# Patient Record
Sex: FEMALE | Race: WHITE | NOT HISPANIC OR LATINO | Employment: FULL TIME | URBAN - NONMETROPOLITAN AREA
[De-identification: names, ages, dates, MRNs, and addresses within clinical notes are randomized per-mention and may not be internally consistent; named-entity substitution may affect disease eponyms.]

---

## 2017-11-22 ENCOUNTER — OFFICE VISIT (OUTPATIENT)
Dept: URGENT CARE | Facility: PHYSICIAN GROUP | Age: 22
End: 2017-11-22
Payer: MEDICAID

## 2017-11-22 VITALS
HEART RATE: 112 BPM | BODY MASS INDEX: 36.29 KG/M2 | DIASTOLIC BLOOD PRESSURE: 82 MMHG | OXYGEN SATURATION: 95 % | WEIGHT: 180 LBS | TEMPERATURE: 98.6 F | SYSTOLIC BLOOD PRESSURE: 118 MMHG | HEIGHT: 59 IN | RESPIRATION RATE: 16 BRPM

## 2017-11-22 DIAGNOSIS — J40 BRONCHITIS: ICD-10-CM

## 2017-11-22 PROCEDURE — 99214 OFFICE O/P EST MOD 30 MIN: CPT | Performed by: PHYSICIAN ASSISTANT

## 2017-11-22 RX ORDER — ALBUTEROL SULFATE 90 UG/1
2 AEROSOL, METERED RESPIRATORY (INHALATION) EVERY 6 HOURS PRN
Qty: 8.5 G | Refills: 0 | Status: SHIPPED | OUTPATIENT
Start: 2017-11-22 | End: 2019-11-10

## 2017-11-22 RX ORDER — BENZONATATE 100 MG/1
100 CAPSULE ORAL 3 TIMES DAILY PRN
Qty: 21 CAP | Refills: 0 | Status: SHIPPED | OUTPATIENT
Start: 2017-11-22 | End: 2017-11-29

## 2017-11-22 ASSESSMENT — ENCOUNTER SYMPTOMS
SINUS PAIN: 0
COUGH: 1
WHEEZING: 1
ABDOMINAL PAIN: 0
SHORTNESS OF BREATH: 1
FOCAL WEAKNESS: 0
FEVER: 0
SWOLLEN GLANDS: 0
VOMITING: 0
SPUTUM PRODUCTION: 0
CHILLS: 0
TREMORS: 0
SORE THROAT: 1
MYALGIAS: 0
BACK PAIN: 0
NAUSEA: 0
SENSORY CHANGE: 0
DIARRHEA: 0
RHINORRHEA: 1
HEADACHES: 0

## 2017-11-22 NOTE — PROGRESS NOTES
"Subjective:      Nelida Laird is a 22 y.o. female who presents with URI            URI    This is a new problem. Episode onset: 2 days ago. The problem has been gradually worsening. There has been no fever. Associated symptoms include congestion, coughing, rhinorrhea, a sore throat and wheezing. Pertinent negatives include no abdominal pain, chest pain, diarrhea, ear pain, headaches, joint pain, nausea, plugged ear sensation, rash, sinus pain, swollen glands or vomiting. She has tried nothing for the symptoms.   Several ill contacts.  Cough is dry, constant causing sore throat.  Wheezing yesterday/last night with sensation of swelling of the throat-now resolved.    Review of Systems   Constitutional: Negative for chills, fever and malaise/fatigue.   HENT: Positive for congestion, rhinorrhea and sore throat. Negative for ear pain and sinus pain.    Respiratory: Positive for cough, shortness of breath and wheezing. Negative for sputum production.    Cardiovascular: Negative for chest pain.   Gastrointestinal: Negative for abdominal pain, diarrhea, nausea and vomiting.   Musculoskeletal: Negative for back pain, joint pain and myalgias.   Skin: Negative for itching and rash.   Neurological: Negative for tremors, sensory change, focal weakness and headaches.          Objective:     /82   Pulse (!) 112   Temp 37 °C (98.6 °F)   Resp 16   Ht 1.499 m (4' 11\")   Wt 81.6 kg (180 lb)   SpO2 95%   BMI 36.36 kg/m²      Physical Exam   Constitutional: She is oriented to person, place, and time. She appears well-developed and well-nourished.   HENT:   Head: Normocephalic and atraumatic.   Right Ear: External ear normal.   Left Ear: External ear normal.   Mouth/Throat: Oropharynx is clear and moist.   Eyes: EOM are normal. Pupils are equal, round, and reactive to light.   Neck: Normal range of motion. Neck supple.   Cardiovascular: Normal rate, regular rhythm and normal heart sounds.    Pulmonary/Chest: Effort " normal and breath sounds normal. No respiratory distress. She has no wheezes. She has no rales.   Musculoskeletal: Normal range of motion.   Neurological: She is alert and oriented to person, place, and time.   Skin: Skin is warm and dry.   Psychiatric: She has a normal mood and affect. Her behavior is normal. Judgment and thought content normal.   Nursing note and vitals reviewed.              Assessment/Plan:     1. Bronchitis  Patient is well appearing on exam, vitals are stable.  No evidence of respiratory distress or bacterial infection.  Will treat symptoms with Tessalon and Albuterol.  Follow-up if symptoms change, get worse or new symptoms develop.    - benzonatate (TESSALON) 100 MG Cap; Take 1 Cap by mouth 3 times a day as needed for Cough for up to 7 days.  Dispense: 21 Cap; Refill: 0  - albuterol 108 (90 Base) MCG/ACT Aero Soln inhalation aerosol; Inhale 2 Puffs by mouth every 6 hours as needed for Shortness of Breath.  Dispense: 8.5 g; Refill: 0

## 2018-06-03 ENCOUNTER — OFFICE VISIT (OUTPATIENT)
Dept: URGENT CARE | Facility: PHYSICIAN GROUP | Age: 23
End: 2018-06-03
Payer: COMMERCIAL

## 2018-06-03 VITALS
HEART RATE: 92 BPM | OXYGEN SATURATION: 98 % | SYSTOLIC BLOOD PRESSURE: 130 MMHG | HEIGHT: 59 IN | WEIGHT: 180 LBS | BODY MASS INDEX: 36.29 KG/M2 | DIASTOLIC BLOOD PRESSURE: 80 MMHG | TEMPERATURE: 97.6 F

## 2018-06-03 DIAGNOSIS — M94.0 COSTOCHONDRITIS: ICD-10-CM

## 2018-06-03 PROCEDURE — 99214 OFFICE O/P EST MOD 30 MIN: CPT | Performed by: FAMILY MEDICINE

## 2018-06-03 RX ORDER — MELOXICAM 15 MG/1
15 TABLET ORAL DAILY
Qty: 30 TAB | Refills: 0 | Status: SHIPPED | OUTPATIENT
Start: 2018-06-03 | End: 2019-11-10

## 2018-06-03 ASSESSMENT — PAIN SCALES - GENERAL: PAINLEVEL: 5=MODERATE PAIN

## 2018-06-14 ASSESSMENT — ENCOUNTER SYMPTOMS
LOWER EXTREMITY EDEMA: 0
NAUSEA: 0
SHORTNESS OF BREATH: 1
WEAKNESS: 0
BACK PAIN: 0
SYNCOPE: 0
VOMITING: 0
IRREGULAR HEARTBEAT: 0

## 2018-06-14 NOTE — PROGRESS NOTES
"Subjective:   Nelida Laird is a 23 y.o. female who presents for Chest Pain (SOB, x 3 days)        Chest Pain    This is a new problem. The current episode started in the past 7 days. The onset quality is gradual. The problem occurs constantly. The problem has been waxing and waning. The pain is present in the lateral region. The pain is moderate. Associated symptoms include shortness of breath. Pertinent negatives include no back pain, irregular heartbeat, lower extremity edema, nausea, syncope, vomiting or weakness.     Review of Systems   Respiratory: Positive for shortness of breath.    Cardiovascular: Positive for chest pain. Negative for syncope.   Gastrointestinal: Negative for nausea and vomiting.   Musculoskeletal: Negative for back pain.   Neurological: Negative for weakness.     No Known Allergies   Objective:   /80   Pulse 92   Temp 36.4 °C (97.6 °F)   Ht 1.499 m (4' 11\")   Wt 81.6 kg (180 lb)   SpO2 98%   BMI 36.36 kg/m²   Physical Exam   Constitutional: She is oriented to person, place, and time. She appears well-developed and well-nourished. No distress.   HENT:   Head: Normocephalic and atraumatic.   Eyes: Conjunctivae and EOM are normal. Pupils are equal, round, and reactive to light.   Cardiovascular: Normal rate and regular rhythm.    No murmur heard.  Pulmonary/Chest: Effort normal and breath sounds normal. No respiratory distress. She exhibits tenderness. She exhibits no mass, no laceration, no edema, no deformity and no swelling.   Abdominal: Soft. She exhibits no distension. There is no tenderness.   Neurological: She is alert and oriented to person, place, and time. She has normal reflexes. No sensory deficit.   Skin: Skin is warm and dry.   Psychiatric: She has a normal mood and affect.         Assessment/Plan:   Assessment    1. Costochondritis  - meloxicam (MOBIC) 15 MG tablet; Take 1 Tab by mouth every day.  Dispense: 30 Tab; Refill: 0    Differential diagnosis, natural " history, supportive care, and indications for immediate follow-up discussed.

## 2019-03-14 ENCOUNTER — OFFICE VISIT (OUTPATIENT)
Dept: URGENT CARE | Facility: PHYSICIAN GROUP | Age: 24
End: 2019-03-14
Payer: COMMERCIAL

## 2019-03-14 VITALS
BODY MASS INDEX: 36.56 KG/M2 | OXYGEN SATURATION: 96 % | WEIGHT: 181 LBS | TEMPERATURE: 97.8 F | HEART RATE: 73 BPM | SYSTOLIC BLOOD PRESSURE: 140 MMHG | DIASTOLIC BLOOD PRESSURE: 90 MMHG | RESPIRATION RATE: 20 BRPM

## 2019-03-14 DIAGNOSIS — H65.93 FLUID LEVEL BEHIND TYMPANIC MEMBRANE OF BOTH EARS: ICD-10-CM

## 2019-03-14 DIAGNOSIS — J00 ACUTE RHINITIS: ICD-10-CM

## 2019-03-14 DIAGNOSIS — J06.9 URI WITH COUGH AND CONGESTION: ICD-10-CM

## 2019-03-14 PROCEDURE — 99214 OFFICE O/P EST MOD 30 MIN: CPT | Performed by: PHYSICIAN ASSISTANT

## 2019-03-14 RX ORDER — FLUTICASONE PROPIONATE 50 MCG
1 SPRAY, SUSPENSION (ML) NASAL 2 TIMES DAILY
Qty: 1 BOTTLE | Refills: 0 | Status: SHIPPED | OUTPATIENT
Start: 2019-03-14 | End: 2019-11-10

## 2019-03-14 RX ORDER — BENZONATATE 200 MG/1
200 CAPSULE ORAL 3 TIMES DAILY PRN
Qty: 30 CAP | Refills: 0 | Status: SHIPPED | OUTPATIENT
Start: 2019-03-14 | End: 2019-11-10

## 2019-03-14 RX ORDER — CHLORPHENIRAMINE MALEATE 4 MG/1
4 TABLET ORAL EVERY 6 HOURS PRN
Qty: 30 TAB | Refills: 0 | Status: SHIPPED | OUTPATIENT
Start: 2019-03-14 | End: 2019-11-10

## 2019-03-14 RX ORDER — NORGESTIMATE AND ETHINYL ESTRADIOL 7DAYSX3 28
KIT ORAL
COMMUNITY
Start: 2019-02-21 | End: 2019-11-10

## 2019-03-14 NOTE — PROGRESS NOTES
Chief Complaint   Patient presents with   • Sinus Problem     sinus pain, sore throat, eye irritation, chest congestion x3 days        HISTORY OF PRESENT ILLNESS: Patient is a 24 y.o. female who presents today for the following:    Cough x 3 days  + nasal congestion, yellow nasal drainage/sputum production, bilateral ear pain (right worse than left)  Denies fever, SOB, body aches, chills  OTC meds tried: mucinex, sudafed    There are no active problems to display for this patient.      Allergies:Patient has no known allergies.    Current Outpatient Prescriptions Ordered in [facility]   Medication Sig Dispense Refill   • benzonatate (TESSALON) 200 MG capsule Take 1 Cap by mouth 3 times a day as needed. 30 Cap 0   • chlorpheniramine (CHLOR-TRIMETRON) 4 MG Tab Take 1 Tab by mouth every 6 hours as needed (nasal congestion/ear pressure). 30 Tab 0   • fluticasone (FLONASE) 50 MCG/ACT nasal spray Spray 1 Spray in nose 2 times a day. 1 Bottle 0   • TRI-SPRINTEC 0.18/0.215/0.25 MG-35 MCG Tab      • meloxicam (MOBIC) 15 MG tablet Take 1 Tab by mouth every day. (Patient not taking: Reported on 3/14/2019) 30 Tab 0   • albuterol 108 (90 Base) MCG/ACT Aero Soln inhalation aerosol Inhale 2 Puffs by mouth every 6 hours as needed for Shortness of Breath. (Patient not taking: Reported on 3/14/2019) 8.5 g 0   • cyclobenzaprine (FLEXERIL) 10 MG Tab Take 1 Tab by mouth 3 times a day as needed. (Patient not taking: Reported on 3/14/2019) 30 Tab 0     No current Epic-ordered facility-administered medications on file.        No past medical history on file.    Social History   Substance Use Topics   • Smoking status: Never Smoker   • Smokeless tobacco: Never Used   • Alcohol use No       No family status information on file.   No family history on file.    Review of Systems:   Constitutional ROS: No unexpected change in weight, No weakness, No fatigue  Eye ROS: No recent significant change in vision, No eye pain, redness, discharge  Ear ROS: No  drainage, No tinnitus or vertigo, No recent change in hearing  Mouth/Throat ROS: No teeth or gum problems, No bleeding gums, No tongue complaints  Neck ROS: No swollen glands, No significant pain in neck  Pulmonary ROS: No chronic cough, sputum, or hemoptysis, No dyspnea on exertion, No wheezing  Cardiovascular ROS: No diaphoresis, No edema, No palpitations  Gastrointestinal ROS: No change in bowel habits, No significant change in appetite, No nausea, vomiting, diarrhea, or constipation  Musculoskeletal/Extremities ROS: No peripheral edema, No pain, redness or swelling on the joints  Hematologic/Lymphatic ROS: No chills, No night sweats, No weight loss  Skin/Integumentary ROS: No edema, No evidence of rash, No itching      Exam:  Blood pressure 140/90, pulse 73, temperature 36.6 °C (97.8 °F), resp. rate 20, weight 82.1 kg (181 lb), SpO2 96 %.  General: Well developed, well nourished. No distress.  Eye: PERRL/EOMI; conjunctivae clear, lids normal.  ENMT: Lips without lesions, MMM. Oropharynx is clear. Bilateral TMs are within normal limits but with clear fluid posterior lid bilaterally.  Nasal mucosa is edematous bilaterally.  Pulmonary: Unlabored respiratory effort. Lungs clear to auscultation, no wheezes, no rhonchi.  Cardiovascular: Regular rate and rhythm without murmur.    Neurologic: Grossly nonfocal. No facial asymmetry noted.  Lymph: No cervical lymphadenopathy noted.  Skin: Warm, dry, good turgor. No rashes in visible areas.   Psych: Normal mood. Alert and oriented x3. Judgment and insight is normal.    Assessment/Plan:  Discussed likely viral etiology. Discussed appropriate over-the-counter symptomatic medication, and when to return to clinic.  Use all medication as directed.  Follow up for worsening or persistent symptoms.  1. URI with cough and congestion  benzonatate (TESSALON) 200 MG capsule   2. Fluid level behind tympanic membrane of both ears  chlorpheniramine (CHLOR-TRIMETRON) 4 MG Tab     fluticasone (FLONASE) 50 MCG/ACT nasal spray   3. Acute rhinitis  chlorpheniramine (CHLOR-TRIMETRON) 4 MG Tab    fluticasone (FLONASE) 50 MCG/ACT nasal spray

## 2019-03-22 ENCOUNTER — OFFICE VISIT (OUTPATIENT)
Dept: URGENT CARE | Facility: PHYSICIAN GROUP | Age: 24
End: 2019-03-22
Payer: COMMERCIAL

## 2019-03-22 ENCOUNTER — HOSPITAL ENCOUNTER (OUTPATIENT)
Facility: MEDICAL CENTER | Age: 24
End: 2019-03-22
Attending: PHYSICIAN ASSISTANT
Payer: COMMERCIAL

## 2019-03-22 VITALS
TEMPERATURE: 97.6 F | BODY MASS INDEX: 37.37 KG/M2 | SYSTOLIC BLOOD PRESSURE: 112 MMHG | HEART RATE: 82 BPM | WEIGHT: 185 LBS | OXYGEN SATURATION: 98 % | DIASTOLIC BLOOD PRESSURE: 68 MMHG

## 2019-03-22 DIAGNOSIS — J02.9 PHARYNGITIS, UNSPECIFIED ETIOLOGY: ICD-10-CM

## 2019-03-22 PROCEDURE — 99213 OFFICE O/P EST LOW 20 MIN: CPT | Performed by: PHYSICIAN ASSISTANT

## 2019-03-22 PROCEDURE — 87077 CULTURE AEROBIC IDENTIFY: CPT

## 2019-03-22 PROCEDURE — 87070 CULTURE OTHR SPECIMN AEROBIC: CPT

## 2019-03-22 ASSESSMENT — ENCOUNTER SYMPTOMS
NAUSEA: 0
HEADACHES: 1
EYE PAIN: 0
TROUBLE SWALLOWING: 1
SORE THROAT: 1
ABDOMINAL PAIN: 0
HOARSE VOICE: 1
FEVER: 0
SHORTNESS OF BREATH: 0
VOMITING: 0
COUGH: 1

## 2019-03-23 DIAGNOSIS — J02.9 SORE THROAT: ICD-10-CM

## 2019-03-23 NOTE — PROGRESS NOTES
Subjective:      Nelida Laird is a 24 y.o. female who presents with Otalgia (bilateral ear pain x 2 weeks ); Congestion (x 2 weeks ); and Wheezing            Pharyngitis    This is a recurrent problem. Episode onset: 11-12 days ago. The problem has been gradually worsening. Neither side of throat is experiencing more pain than the other. There has been no fever. The pain is moderate. Associated symptoms include congestion, coughing, ear pain, headaches, a hoarse voice, a plugged ear sensation and trouble swallowing. Pertinent negatives include no abdominal pain, ear discharge, shortness of breath or vomiting. She has had exposure to strep. Exposure to: possible. Treatments tried: Sudafed, Tessalon Perls, and a Decongestant. The treatment provided mild relief.     PMH:  has no past medical history on file.  MEDS:   Current Outpatient Prescriptions:   •  TRI-SPRINTEC 0.18/0.215/0.25 MG-35 MCG Tab, , Disp: , Rfl:   •  benzonatate (TESSALON) 200 MG capsule, Take 1 Cap by mouth 3 times a day as needed., Disp: 30 Cap, Rfl: 0  •  chlorpheniramine (CHLOR-TRIMETRON) 4 MG Tab, Take 1 Tab by mouth every 6 hours as needed (nasal congestion/ear pressure)., Disp: 30 Tab, Rfl: 0  •  fluticasone (FLONASE) 50 MCG/ACT nasal spray, Spray 1 Spray in nose 2 times a day. (Patient not taking: Reported on 3/22/2019), Disp: 1 Bottle, Rfl: 0  •  meloxicam (MOBIC) 15 MG tablet, Take 1 Tab by mouth every day. (Patient not taking: Reported on 3/14/2019), Disp: 30 Tab, Rfl: 0  •  albuterol 108 (90 Base) MCG/ACT Aero Soln inhalation aerosol, Inhale 2 Puffs by mouth every 6 hours as needed for Shortness of Breath. (Patient not taking: Reported on 3/14/2019), Disp: 8.5 g, Rfl: 0  •  cyclobenzaprine (FLEXERIL) 10 MG Tab, Take 1 Tab by mouth 3 times a day as needed. (Patient not taking: Reported on 3/14/2019), Disp: 30 Tab, Rfl: 0  ALLERGIES: No Known Allergies  SURGHX: No past surgical history on file.  SOCHX:  reports that she has never  smoked. She has never used smokeless tobacco. She reports that she does not drink alcohol or use drugs.  FH: Family history was reviewed, no pertinent findings to report    Review of Systems   Constitutional: Negative for fever.   HENT: Positive for congestion, ear pain, hoarse voice, sore throat and trouble swallowing. Negative for ear discharge.    Eyes: Negative for pain.   Respiratory: Positive for cough. Negative for shortness of breath.    Cardiovascular: Negative for chest pain and leg swelling.   Gastrointestinal: Negative for abdominal pain, nausea and vomiting.   Genitourinary: Negative for dysuria, frequency and urgency.   Musculoskeletal: Negative for joint pain.   Skin: Negative for rash.   Neurological: Positive for headaches.          Objective:     /68   Pulse 82   Temp 36.4 °C (97.6 °F) (Temporal)   Wt 83.9 kg (185 lb)   SpO2 98%   BMI 37.37 kg/m²      Physical Exam   Constitutional: She is oriented to person, place, and time. She appears well-developed and well-nourished. No distress.   HENT:   Head: Normocephalic and atraumatic.   Right Ear: Tympanic membrane, external ear and ear canal normal.   Left Ear: Tympanic membrane, external ear and ear canal normal.   Mouth/Throat: Mucous membranes are normal. Posterior oropharyngeal erythema present.   Eyes: Conjunctivae and EOM are normal.   Neck: Normal range of motion. Neck supple.   Cardiovascular: Normal rate, regular rhythm and normal heart sounds.    Pulmonary/Chest: Effort normal and breath sounds normal.   Musculoskeletal: Normal range of motion.   Neurological: She is alert and oriented to person, place, and time.   Skin: Skin is warm and dry.          Progress:   POCT Rapid Strep: Negative     Throat Culture - pending   Will call patient only if the results are positive, and she requires antibiotics.      Assessment/Plan:     1. Pharyngitis  The cause of the patient's sore throat is unknown at this time. It is likely due to a viral  illness. Her rapid strep test was negative today in clinic, indicating her symptoms are unlikely due to strep pharyngitis. Will culture her throat to rule out all other bacterial sources. Will only call the patient if her culture results are positive, and she requires antibiotics. Discussed strict return precautions with the patient, and she verbalized understanding.   Plan:  Throat Culture - pending  OTC Tylenol or Motrin for fever/discomfort.  OTC Supportive Care for Sore Throat - warm salt water gargles, sore throat lozenges, warm lemon water, and/or tea.  Drink plenty of fluids  Return to clinic or go to the ED if symptoms worsen or fail to improve, or if patient develops severe fever, worsening sore throat, change in voice, difficulty handling secretions, and/or shortness of breath.     Discussed plan with the patient, and she agrees to the above.

## 2019-03-24 ENCOUNTER — TELEPHONE (OUTPATIENT)
Dept: URGENT CARE | Facility: PHYSICIAN GROUP | Age: 24
End: 2019-03-24

## 2019-03-24 DIAGNOSIS — J02.8 PHARYNGITIS DUE TO OTHER ORGANISM: ICD-10-CM

## 2019-03-24 RX ORDER — AMOXICILLIN 500 MG/1
500 CAPSULE ORAL 2 TIMES DAILY
Qty: 20 CAP | Refills: 0 | Status: SHIPPED | OUTPATIENT
Start: 2019-03-24 | End: 2019-04-03

## 2019-03-25 LAB
BACTERIA SPEC RESP CULT: ABNORMAL
BACTERIA SPEC RESP CULT: ABNORMAL
SIGNIFICANT IND 70042: ABNORMAL
SITE SITE: ABNORMAL
SOURCE SOURCE: ABNORMAL

## 2019-03-25 NOTE — TELEPHONE ENCOUNTER
Called patient with the results of her throat culture.   Sent antibiotics to the patient's preferred pharmacy.   Instructed patient to return to clinic if her symptoms do not improve, despite the antibiotic use.

## 2019-11-10 ENCOUNTER — OFFICE VISIT (OUTPATIENT)
Dept: URGENT CARE | Facility: PHYSICIAN GROUP | Age: 24
End: 2019-11-10
Payer: COMMERCIAL

## 2019-11-10 VITALS
RESPIRATION RATE: 16 BRPM | WEIGHT: 184 LBS | HEART RATE: 72 BPM | HEIGHT: 59 IN | SYSTOLIC BLOOD PRESSURE: 118 MMHG | TEMPERATURE: 99.5 F | BODY MASS INDEX: 37.09 KG/M2 | OXYGEN SATURATION: 96 % | DIASTOLIC BLOOD PRESSURE: 62 MMHG

## 2019-11-10 DIAGNOSIS — R68.89 FLU-LIKE SYMPTOMS: ICD-10-CM

## 2019-11-10 PROCEDURE — 99214 OFFICE O/P EST MOD 30 MIN: CPT | Performed by: PHYSICIAN ASSISTANT

## 2019-11-10 RX ORDER — OSELTAMIVIR PHOSPHATE 75 MG/1
75 CAPSULE ORAL 2 TIMES DAILY
Qty: 10 CAP | Refills: 0 | Status: SHIPPED | OUTPATIENT
Start: 2019-11-10 | End: 2022-03-25

## 2019-11-10 NOTE — LETTER
November 10, 2019         Patient: Nelida Morrell   YOB: 1995   Date of Visit: 11/10/2019           To Whom it May Concern:    Nelida Morrell was seen in my clinic on 11/10/2019. She may return to work on 11/12/19.    If you have any questions or concerns, please don't hesitate to call.        Sincerely,           Isa Santiago P.A.-C.  Electronically Signed

## 2019-11-10 NOTE — PROGRESS NOTES
Chief Complaint   Patient presents with   • Cough     bodyache   • Fever       HISTORY OF PRESENT ILLNESS: Patient is a 24 y.o. female who presents today for the following:    Patient comes in for evaluation acute onset body aches that started 2 days ago.  Patient states she has had a cough for a few days but the body aches and fever started all of a sudden.  She states she hurts from her head to her toes.  Excedrin has not helped.  She denies any significant cough, runny nose, and has not had any nausea, vomiting, or diarrhea.  She has not had any over-the-counter medication today.    There are no active problems to display for this patient.      Allergies:Patient has no known allergies.    Current Outpatient Medications Ordered in Epic   Medication Sig Dispense Refill   • oseltamivir (TAMIFLU) 75 MG Cap Take 1 Cap by mouth 2 times a day. 10 Cap 0     No current Epic-ordered facility-administered medications on file.        History reviewed. No pertinent past medical history.    Social History     Tobacco Use   • Smoking status: Never Smoker   • Smokeless tobacco: Never Used   Substance Use Topics   • Alcohol use: No   • Drug use: No       No family status information on file.   History reviewed. No pertinent family history.    Review of Systems:   Constitutional ROS: No unexpected change in weight, No weakness, No fatigue  Eye ROS: No recent significant change in vision, No eye pain, redness, discharge  Ear ROS: No drainage, No tinnitus or vertigo, No recent change in hearing  Mouth/Throat ROS: No teeth or gum problems, No bleeding gums, No tongue complaints  Neck ROS: No swollen glands, No significant pain in neck  Pulmonary ROS: No chronic cough, sputum, or hemoptysis, No dyspnea on exertion, No wheezing  Cardiovascular ROS: No diaphoresis, No edema, No palpitations  Gastrointestinal ROS: No change in bowel habits, No significant change in appetite, No nausea, vomiting, diarrhea, or  "constipation  Musculoskeletal/Extremities ROS: No peripheral edema, No pain, redness or swelling on the joints  Hematologic/Lymphatic ROS: Positive for fever and body aches.  Skin/Integumentary ROS: No edema, No evidence of rash, No itching      Exam:  /62 (BP Location: Left arm, Patient Position: Sitting, BP Cuff Size: Adult)   Pulse 72   Temp 37.5 °C (99.5 °F) (Temporal)   Resp 16   Ht 1.499 m (4' 11\")   Wt 83.5 kg (184 lb)   SpO2 96%   General: Well developed, well nourished. No distress. Nontoxic in appearance but clearly does not feel well.  Eye: PERRL/EOMI; conjunctivae clear, lids normal.  ENMT: Lips without lesions, MMM. Oropharynx is clear. Bilateral TMs are within normal limits.  Pulmonary: Unlabored respiratory effort. Lungs clear to auscultation, no wheezes, no rhonchi.  Cardiovascular: Regular rate and rhythm without murmur.   Neurologic: Grossly nonfocal. No facial asymmetry noted.  Lymph: No cervical lymphadenopathy noted.  Skin: Warm, dry, good turgor. No rashes in visible areas.   Psych: Normal mood. Alert and oriented x3. Judgment and insight is normal.    Assessment/Plan  Discussed with patient that her symptoms are consistent with influenza.  Low suspicion for pneumonia given vital signs.  Discussed CDC recommendations for Tamiflu and patient would like to be treated.  Use all medication as directed.  Follow-up for worsening or persistent symptoms.   1. Flu-like symptoms  oseltamivir (TAMIFLU) 75 MG Cap       "

## 2020-10-29 ENCOUNTER — INITIAL PRENATAL (OUTPATIENT)
Dept: OBGYN | Facility: CLINIC | Age: 25
End: 2020-10-29
Payer: COMMERCIAL

## 2020-10-29 VITALS
WEIGHT: 187 LBS | DIASTOLIC BLOOD PRESSURE: 73 MMHG | HEIGHT: 59 IN | SYSTOLIC BLOOD PRESSURE: 117 MMHG | BODY MASS INDEX: 37.7 KG/M2

## 2020-10-29 DIAGNOSIS — O21.9 NAUSEA AND VOMITING IN PREGNANCY PRIOR TO 22 WEEKS GESTATION: ICD-10-CM

## 2020-10-29 DIAGNOSIS — N91.1 AMENORRHEA, SECONDARY: ICD-10-CM

## 2020-10-29 DIAGNOSIS — Z32.01 POSITIVE PREGNANCY TEST: ICD-10-CM

## 2020-10-29 PROCEDURE — 76830 TRANSVAGINAL US NON-OB: CPT | Performed by: OBSTETRICS & GYNECOLOGY

## 2020-10-29 PROCEDURE — 99203 OFFICE O/P NEW LOW 30 MIN: CPT | Mod: 25 | Performed by: OBSTETRICS & GYNECOLOGY

## 2020-10-29 RX ORDER — METOCLOPRAMIDE 10 MG/1
10 TABLET ORAL 4 TIMES DAILY
Qty: 15 TAB | Refills: 2 | Status: SHIPPED | OUTPATIENT
Start: 2020-10-29 | End: 2022-03-25

## 2020-10-29 NOTE — PROGRESS NOTES
"Subjective:      Nelida Morrell is a 25 y.o. female who presents with amenorrhea and positive home pregnancy test            HPI patient is a 25-year-old G1 who presents today with amenorrhea and positive home pregnancy test.  Patient is doing well except for complaints of some intermittent nausea with rare emesis.  She has had 1 episode of postcoital bleeding which is light.  She denies any fevers or dysuria.  Reports normal bowel and bladder functions.  Patient is taking prenatal vitamins.  Her last menstrual period is unsure/approximate around 7/27/2020.  Pregnancy was planned/patient was not using contraception    ROS all organ systems were reviewed and were negative except for complaints in HPI       Objective:     Ht 1.499 m (4' 11\")   Wt 84.8 kg (187 lb)   LMP 07/27/2020 (Approximate)   BMI 37.77 kg/m²      Physical Exam  Vitals signs and nursing note reviewed. Exam conducted with a chaperone present.   Constitutional:       General: She is not in acute distress.     Appearance: Normal appearance. She is obese. She is not toxic-appearing.   HENT:      Head: Normocephalic and atraumatic.      Nose: No congestion or rhinorrhea.   Eyes:      General:         Right eye: No discharge.         Left eye: No discharge.      Conjunctiva/sclera: Conjunctivae normal.   Neck:      Musculoskeletal: Normal range of motion and neck supple. No neck rigidity.   Cardiovascular:      Rate and Rhythm: Normal rate and regular rhythm.      Pulses: Normal pulses.      Heart sounds: Normal heart sounds. No murmur. No gallop.    Pulmonary:      Effort: Pulmonary effort is normal.      Breath sounds: Normal breath sounds. No rales.   Abdominal:      General: Abdomen is flat. Bowel sounds are normal. There is no distension.      Palpations: Abdomen is soft.      Tenderness: There is no abdominal tenderness.   Genitourinary:     General: Normal vulva.      Vagina: No vaginal discharge.   Musculoskeletal: Normal range of motion.     "  Right lower leg: No edema.      Left lower leg: No edema.   Lymphadenopathy:      Cervical: No cervical adenopathy.   Skin:     General: Skin is warm and dry.      Findings: No lesion or rash.   Neurological:      General: No focal deficit present.      Mental Status: She is alert and oriented to person, place, and time. Mental status is at baseline.      Gait: Gait normal.   Psychiatric:         Mood and Affect: Mood normal.         Behavior: Behavior normal.         Thought Content: Thought content normal.         Judgment: Judgment normal.            Ultrasound was performed and read by me:    Pool intrauterine pregnancy noted  Fetal heart rate was 161 bpm  Crown-rump length measurements give a gestational age of 12 weeks and 6 days  EDC by CRL is 5/7/2021  EDC by LMP is 5/3/2021  1.6 cm left hemorrhagic cyst noted  No right adnexal mass noted  No pelvic free fluid noted    Impression: Pool intrauterine pregnancy at 12 weeks and 6 days gestation with EDC of 5/3/2021.     Assessment/Plan:        1. Amenorrhea, secondary  25-year-old G1 presenting with amenorrhea and positive home pregnancy test.  Normal intrauterine pregnancy confirmed by ultrasound at 12 weeks and 6 days gestation.  Findings are reviewed.  Pregnancy precautions and restrictions were discussed  Patient to continue prenatal vitamins  Patient to follow-up in 1 week for new OB exam    2. Positive pregnancy test      3. Nausea and vomiting in pregnancy prior to 22 weeks gestation  We discussed nausea and vomiting in pregnancy and we discussed treatment options including dietary modification and vitamin B6 therapy.  Patient will try days and if she does not have relief she would like to try Reglan.  Patient counseled on medication and agrees to usage.  Prescription sent to pharmacy  - metoclopramide (REGLAN) 10 MG Tab; Take 1 Tab by mouth 4 times a day.  Dispense: 15 Tab; Refill: 2    4.  Precautions and plan of care reviewed

## 2020-10-29 NOTE — PROGRESS NOTES
Pt here today for   Pt states some n/v, had 1 episode of bleeding, did have intercourse  Reports +  Good # [phone]  Pharmacy Confirmed.

## 2020-11-05 ENCOUNTER — INITIAL PRENATAL (OUTPATIENT)
Dept: OBGYN | Facility: CLINIC | Age: 25
End: 2020-11-05
Payer: COMMERCIAL

## 2020-11-05 ENCOUNTER — HOSPITAL ENCOUNTER (OUTPATIENT)
Facility: MEDICAL CENTER | Age: 25
End: 2020-11-05
Attending: NURSE PRACTITIONER
Payer: COMMERCIAL

## 2020-11-05 VITALS — SYSTOLIC BLOOD PRESSURE: 135 MMHG | WEIGHT: 187 LBS | BODY MASS INDEX: 37.77 KG/M2 | DIASTOLIC BLOOD PRESSURE: 82 MMHG

## 2020-11-05 DIAGNOSIS — Z34.01 SUPERVISION OF NORMAL FIRST PREGNANCY IN FIRST TRIMESTER: Primary | ICD-10-CM

## 2020-11-05 PROCEDURE — 87624 HPV HI-RISK TYP POOLED RSLT: CPT

## 2020-11-05 PROCEDURE — 59401 PR NEW OB VISIT: CPT | Performed by: NURSE PRACTITIONER

## 2020-11-05 PROCEDURE — 88175 CYTOPATH C/V AUTO FLUID REDO: CPT

## 2020-11-05 PROCEDURE — 87591 N.GONORRHOEAE DNA AMP PROB: CPT

## 2020-11-05 PROCEDURE — 87491 CHLMYD TRACH DNA AMP PROBE: CPT

## 2020-11-05 ASSESSMENT — ENCOUNTER SYMPTOMS
GASTROINTESTINAL NEGATIVE: 1
CONSTITUTIONAL NEGATIVE: 1
PSYCHIATRIC NEGATIVE: 1
EYES NEGATIVE: 1
RESPIRATORY NEGATIVE: 1
CARDIOVASCULAR NEGATIVE: 1
MUSCULOSKELETAL NEGATIVE: 1

## 2020-11-05 NOTE — PROGRESS NOTES
Pt. Here for NOB visit today.  #  [phone]  First prenatal care  Pt. States no VB or LOF   Pharmacy verified  AFP?  yes  Denied Flu Shot

## 2020-11-06 DIAGNOSIS — Z34.01 SUPERVISION OF NORMAL FIRST PREGNANCY IN FIRST TRIMESTER: ICD-10-CM

## 2020-11-06 NOTE — PROGRESS NOTES
S:  Nelida Morrell is a 25 y.o.  who presents for her new OB exam.  She is 14w3d with and MAGNO of Estimated Date of Delivery:5/3/2021 . based off of LMPand confirmed with 12 week US . She has no complaints.  She is currently working at [employer], some  heavy lifting or chemical exposure. Denies ER visits or previous care in this pregnancy but did have  here with us.     Desires AFP, will decide if desires NIPT.  Desires CF.  Denies VB, LOF, or cramping.  She did have cramping until a  Week ago but it has resolved since.  Denies dysuria, vaginal DC. Reports no fetal movement.     Pt is  and lives with her .  Pregnancy is planned and welcomed.      Discussed diet and exercise during pregnancy. Encouraged good nutrition, and daily exercise including walking or swimming. Discussed expected weight gain during pregnancy. Discussed adequate hydration during pregnancy.  Review of Systems   Constitutional: Negative.    HENT: Negative.    Eyes: Negative.    Respiratory: Negative.    Cardiovascular: Negative.    Gastrointestinal: Negative.    Genitourinary: Negative.    Musculoskeletal: Negative.    Skin: Negative.    Endo/Heme/Allergies: Negative.    Psychiatric/Behavioral: Negative.    All other systems reviewed and are negative.      No past medical history on file.  No family history on file.  Social History     Socioeconomic History   • Marital status:      Spouse name: Not on file   • Number of children: Not on file   • Years of education: Not on file   • Highest education level: Not on file   Occupational History   • Not on file   Social Needs   • Financial resource strain: Not on file   • Food insecurity     Worry: Not on file     Inability: Not on file   • Transportation needs     Medical: Not on file     Non-medical: Not on file   Tobacco Use   • Smoking status: Never Smoker   • Smokeless tobacco: Never Used   Substance and Sexual Activity   • Alcohol use: No   • Drug use: No   • Sexual  activity: Yes     Partners: Male     Birth control/protection: None   Lifestyle   • Physical activity     Days per week: Not on file     Minutes per session: Not on file   • Stress: Not on file   Relationships   • Social connections     Talks on phone: Not on file     Gets together: Not on file     Attends Sabianism service: Not on file     Active member of club or organization: Not on file     Attends meetings of clubs or organizations: Not on file     Relationship status: Not on file   • Intimate partner violence     Fear of current or ex partner: Not on file     Emotionally abused: Not on file     Physically abused: Not on file     Forced sexual activity: Not on file   Other Topics Concern   • Not on file   Social History Narrative   • Not on file     OB History    Para Term  AB Living   1             SAB TAB Ectopic Molar Multiple Live Births                    # Outcome Date GA Lbr Abdon/2nd Weight Sex Delivery Anes PTL Lv   1 Current                History of Varicella Virus: had vaccine  History of HSV I or II in self or partner: denies  History of Thyroid problems: denies    O:  /82   Wt 84.8 kg (187 lb)    See Prenatal Physical.    Wet mount: denies  Physical Exam   Constitutional: She is oriented to person, place, and time and well-developed, well-nourished, and in no distress.   HENT:   Head: Normocephalic and atraumatic.   Right Ear: External ear normal.   Left Ear: External ear normal.   Eyes: Pupils are equal, round, and reactive to light. Conjunctivae and EOM are normal.   Neck: Normal range of motion. Neck supple. No thyromegaly present.   Cardiovascular: Normal rate, regular rhythm, normal heart sounds and intact distal pulses.   Pulmonary/Chest: Effort normal and breath sounds normal. No respiratory distress.   Abdominal: Soft. Bowel sounds are normal.   Genitourinary:    Vagina and cervix normal.      Genitourinary Comments: Some spotting with pap     Musculoskeletal: Normal  range of motion.   Neurological: She is alert and oriented to person, place, and time. Gait normal.   Skin: Skin is warm and dry.   Psychiatric: Mood, memory, affect and judgment normal.   Nursing note and vitals reviewed.        A:   1.  IUP @ 14w3d per LMP and confirmed by 12 week US        2.  S=D        3.  See problem list below        4.  Supervision of normal first pregnancy     There are no active problems to display for this patient.        P:  1.  GC/CT & pap done        2.  Prenatal labs ordered - lab slip given        3.  Discussed PNV, diet, avoidances and adequate water intake        4.  NOB packet given        5.  Return to office in 4 wks        6.  Complete OB US in 6 wks        7.  Will decide if she desires NIPT testing     Orders Placed This Encounter   • US-OB 2ND 3RD TRI COMPLETE   • PREG CNTR PRENATAL PN   • URINE DRUG SCREEN W/CONF (AR)   • THINPREP PAP W/HPV AND CTNG

## 2020-11-11 LAB
C TRACH DNA GENITAL QL NAA+PROBE: NEGATIVE
CYTOLOGY REG CYTOL: NORMAL
HPV HR 12 DNA CVX QL NAA+PROBE: NEGATIVE
HPV16 DNA SPEC QL NAA+PROBE: NEGATIVE
HPV18 DNA SPEC QL NAA+PROBE: NEGATIVE
N GONORRHOEA DNA GENITAL QL NAA+PROBE: NEGATIVE
SPECIMEN SOURCE: NORMAL
SPECIMEN SOURCE: NORMAL

## 2020-11-18 ENCOUNTER — HOSPITAL ENCOUNTER (OUTPATIENT)
Dept: LAB | Facility: MEDICAL CENTER | Age: 25
End: 2020-11-18
Attending: NURSE PRACTITIONER
Payer: COMMERCIAL

## 2020-11-18 DIAGNOSIS — Z34.01 SUPERVISION OF NORMAL FIRST PREGNANCY IN FIRST TRIMESTER: ICD-10-CM

## 2020-11-18 LAB — HIV 1+2 AB+HIV1 P24 AG SERPL QL IA: NORMAL

## 2020-11-18 PROCEDURE — 80307 DRUG TEST PRSMV CHEM ANLYZR: CPT

## 2020-11-18 PROCEDURE — 86850 RBC ANTIBODY SCREEN: CPT

## 2020-11-18 PROCEDURE — 86780 TREPONEMA PALLIDUM: CPT

## 2020-11-18 PROCEDURE — 81220 CFTR GENE COM VARIANTS: CPT

## 2020-11-18 PROCEDURE — 87389 HIV-1 AG W/HIV-1&-2 AB AG IA: CPT

## 2020-11-18 PROCEDURE — 87340 HEPATITIS B SURFACE AG IA: CPT

## 2020-11-18 PROCEDURE — 86900 BLOOD TYPING SEROLOGIC ABO: CPT

## 2020-11-18 PROCEDURE — 86762 RUBELLA ANTIBODY: CPT

## 2020-11-18 PROCEDURE — 85025 COMPLETE CBC W/AUTO DIFF WBC: CPT

## 2020-11-18 PROCEDURE — 81001 URINALYSIS AUTO W/SCOPE: CPT

## 2020-11-18 PROCEDURE — 36415 COLL VENOUS BLD VENIPUNCTURE: CPT

## 2020-11-18 PROCEDURE — 86901 BLOOD TYPING SEROLOGIC RH(D): CPT

## 2020-11-19 LAB
ABO GROUP BLD: NORMAL
APPEARANCE UR: ABNORMAL
BACTERIA #/AREA URNS HPF: ABNORMAL /HPF
BASOPHILS # BLD AUTO: 0.3 % (ref 0–1.8)
BASOPHILS # BLD: 0.03 K/UL (ref 0–0.12)
BILIRUB UR QL STRIP.AUTO: NEGATIVE
BLD GP AB SCN SERPL QL: NORMAL
CAOX CRY #/AREA URNS HPF: ABNORMAL /HPF
COLOR UR: YELLOW
EOSINOPHIL # BLD AUTO: 0.06 K/UL (ref 0–0.51)
EOSINOPHIL NFR BLD: 0.6 % (ref 0–6.9)
EPI CELLS #/AREA URNS HPF: ABNORMAL /HPF
ERYTHROCYTE [DISTWIDTH] IN BLOOD BY AUTOMATED COUNT: 39.2 FL (ref 35.9–50)
GLUCOSE UR STRIP.AUTO-MCNC: NEGATIVE MG/DL
HBV SURFACE AG SER QL: ABNORMAL
HCT VFR BLD AUTO: 41 % (ref 37–47)
HGB BLD-MCNC: 13.9 G/DL (ref 12–16)
HYALINE CASTS #/AREA URNS LPF: ABNORMAL /LPF
IMM GRANULOCYTES # BLD AUTO: 0.03 K/UL (ref 0–0.11)
IMM GRANULOCYTES NFR BLD AUTO: 0.3 % (ref 0–0.9)
KETONES UR STRIP.AUTO-MCNC: 40 MG/DL
LEUKOCYTE ESTERASE UR QL STRIP.AUTO: ABNORMAL
LYMPHOCYTES # BLD AUTO: 1.77 K/UL (ref 1–4.8)
LYMPHOCYTES NFR BLD: 16.6 % (ref 22–41)
MCH RBC QN AUTO: 29.8 PG (ref 27–33)
MCHC RBC AUTO-ENTMCNC: 33.9 G/DL (ref 33.6–35)
MCV RBC AUTO: 88 FL (ref 81.4–97.8)
MICRO URNS: ABNORMAL
MONOCYTES # BLD AUTO: 0.35 K/UL (ref 0–0.85)
MONOCYTES NFR BLD AUTO: 3.3 % (ref 0–13.4)
NEUTROPHILS # BLD AUTO: 8.42 K/UL (ref 2–7.15)
NEUTROPHILS NFR BLD: 78.9 % (ref 44–72)
NITRITE UR QL STRIP.AUTO: NEGATIVE
NRBC # BLD AUTO: 0 K/UL
NRBC BLD-RTO: 0 /100 WBC
PH UR STRIP.AUTO: 6 [PH] (ref 5–8)
PLATELET # BLD AUTO: 224 K/UL (ref 164–446)
PMV BLD AUTO: 9.7 FL (ref 9–12.9)
PROT UR QL STRIP: NEGATIVE MG/DL
RBC # BLD AUTO: 4.66 M/UL (ref 4.2–5.4)
RBC # URNS HPF: ABNORMAL /HPF
RBC UR QL AUTO: NEGATIVE
RH BLD: NORMAL
RUBV AB SER QL: 10.2 IU/ML
SP GR UR STRIP.AUTO: 1.02
TREPONEMA PALLIDUM IGG+IGM AB [PRESENCE] IN SERUM OR PLASMA BY IMMUNOASSAY: ABNORMAL
UROBILINOGEN UR STRIP.AUTO-MCNC: 1 MG/DL
WBC # BLD AUTO: 10.7 K/UL (ref 4.8–10.8)
WBC #/AREA URNS HPF: ABNORMAL /HPF

## 2020-11-19 PROCEDURE — 86900 BLOOD TYPING SEROLOGIC ABO: CPT

## 2020-11-19 PROCEDURE — 86901 BLOOD TYPING SEROLOGIC RH(D): CPT

## 2020-11-19 PROCEDURE — 86850 RBC ANTIBODY SCREEN: CPT

## 2020-11-20 LAB
AMPHET CTO UR CFM-MCNC: NEGATIVE NG/ML
BARBITURATES CTO UR CFM-MCNC: NEGATIVE NG/ML
BENZODIAZ CTO UR CFM-MCNC: NEGATIVE NG/ML
CANNABINOIDS CTO UR CFM-MCNC: POSITIVE NG/ML
COCAINE CTO UR CFM-MCNC: NEGATIVE NG/ML
DRUG COMMENT 753798: NORMAL
METHADONE CTO UR CFM-MCNC: NEGATIVE NG/ML
OPIATES CTO UR CFM-MCNC: NEGATIVE NG/ML
PCP CTO UR CFM-MCNC: NEGATIVE NG/ML
PROPOXYPH CTO UR CFM-MCNC: NEGATIVE NG/ML

## 2020-11-22 LAB — THC UR CFM-MCNC: 47 NG/ML

## 2020-11-23 PROBLEM — R82.5 POSITIVE URINE DRUG SCREEN: Status: ACTIVE | Noted: 2020-11-23

## 2020-11-28 LAB
CF EXPANDED VARIANT PANEL INTERP Q4864: NORMAL
CFTR ALLELE 1 BLD/T QL: NEGATIVE
CFTR ALLELE 1 BLD/T QL: NEGATIVE
CFTR MUT ANL BLD/T: NORMAL

## 2020-12-10 ENCOUNTER — ROUTINE PRENATAL (OUTPATIENT)
Dept: OBGYN | Facility: CLINIC | Age: 25
End: 2020-12-10
Payer: COMMERCIAL

## 2020-12-10 VITALS — DIASTOLIC BLOOD PRESSURE: 67 MMHG | BODY MASS INDEX: 38.17 KG/M2 | WEIGHT: 189 LBS | SYSTOLIC BLOOD PRESSURE: 128 MMHG

## 2020-12-10 DIAGNOSIS — Z34.01 SUPERVISION OF NORMAL FIRST PREGNANCY IN FIRST TRIMESTER: ICD-10-CM

## 2020-12-10 PROCEDURE — 90040 PR PRENATAL FOLLOW UP: CPT | Performed by: OBSTETRICS & GYNECOLOGY

## 2020-12-10 NOTE — PROGRESS NOTES
OB Followup;    19w3d    Patient Active Problem List    Diagnosis Date Noted   • Positive urine drug screen-dickibinoids 2020       Vitals:    12/10/20 0917   BP: 128/67   Weight: 85.7 kg (189 lb)       Patient presents for followup of OB care. Currently doing well . Good fetal movement no leakage of fluid no contractions or vaginal bleeding        Size equals dates, normal fetal heart rate      Labs; prenatal labs normal-patient did not perform AFP Tetra yet but she states she will do it as soon as possible  Patient has not scheduled OB fetal survey ultrasound-instructed to do so    Labor precautions given  Increase oral hydration  Discussed proper weight gain during pregnancy  Continue prenatal vitamins  Signs and symptoms of labor/ labor discussed   Discussed our group's policy on prenatal checkups and delivery    Followup in  4 weeks

## 2020-12-10 NOTE — PROGRESS NOTES
Pt here today for OB follow up  Pt states no VB or LOF   Reports +FM  Good # [phone]   Pharmacy Confirmed.

## 2020-12-11 ENCOUNTER — HOSPITAL ENCOUNTER (OUTPATIENT)
Dept: LAB | Facility: MEDICAL CENTER | Age: 25
End: 2020-12-11
Attending: NURSE PRACTITIONER
Payer: COMMERCIAL

## 2020-12-11 PROCEDURE — 36415 COLL VENOUS BLD VENIPUNCTURE: CPT

## 2020-12-11 PROCEDURE — 81511 FTL CGEN ABNOR FOUR ANAL: CPT

## 2020-12-15 LAB
# FETUSES US: NORMAL
AFP MOM SERPL: 0.96
AFP SERPL-MCNC: 44 NG/ML
AGE - REPORTED: 25.3 YR
CURRENT SMOKER: NO
FAMILY MEMBER DISEASES HX: NO
GA METHOD: NORMAL
GA: NORMAL WK
HCG MOM SERPL: 0.97
HCG SERPL-ACNC: NORMAL IU/L
HX OF HEREDITARY DISORDERS: NO
IDDM PATIENT QL: NO
INHIBIN A MOM SERPL: 0.83
INHIBIN A SERPL-MCNC: 126 PG/ML
INTEGRATED SCN PATIENT-IMP: NORMAL
PATHOLOGY STUDY: NORMAL
SPECIMEN DRAWN SERPL: NORMAL
U ESTRIOL MOM SERPL: 0.75
U ESTRIOL SERPL-MCNC: 1.72 NG/ML

## 2020-12-18 ENCOUNTER — HOSPITAL ENCOUNTER (OUTPATIENT)
Dept: RADIOLOGY | Facility: MEDICAL CENTER | Age: 25
End: 2020-12-18
Attending: NURSE PRACTITIONER
Payer: COMMERCIAL

## 2020-12-18 DIAGNOSIS — Z34.01 SUPERVISION OF NORMAL FIRST PREGNANCY IN FIRST TRIMESTER: ICD-10-CM

## 2020-12-18 PROCEDURE — 76805 OB US >/= 14 WKS SNGL FETUS: CPT

## 2020-12-22 DIAGNOSIS — Z34.80 SUPERVISION OF OTHER NORMAL PREGNANCY, ANTEPARTUM: ICD-10-CM

## 2021-01-15 ENCOUNTER — ROUTINE PRENATAL (OUTPATIENT)
Dept: OBGYN | Facility: CLINIC | Age: 26
End: 2021-01-15
Payer: COMMERCIAL

## 2021-01-15 VITALS — DIASTOLIC BLOOD PRESSURE: 64 MMHG | WEIGHT: 193 LBS | BODY MASS INDEX: 38.98 KG/M2 | SYSTOLIC BLOOD PRESSURE: 122 MMHG

## 2021-01-15 DIAGNOSIS — Z34.02 SUPERVISION OF NORMAL FIRST PREGNANCY IN SECOND TRIMESTER: ICD-10-CM

## 2021-01-15 PROCEDURE — 90040 PR PRENATAL FOLLOW UP: CPT | Performed by: OBSTETRICS & GYNECOLOGY

## 2021-01-15 PROCEDURE — 76818 FETAL BIOPHYS PROFILE W/NST: CPT | Performed by: OBSTETRICS & GYNECOLOGY

## 2021-01-15 NOTE — PROGRESS NOTES
OB follow up   + fetal movement.  No VB, LOF or UC's.  Phone # [phone]  Preferred pharmacy confirmed.  Pt report no problems at this time   3rd trimester lab orders pended and instructions given to patient

## 2021-01-15 NOTE — PROGRESS NOTES
S: Pt presents for routine OB follow up.  Patient is doing well without any concerns.  Denies headaches or scotoma.  Reports normal bowel and bladder functions  No contractions, vaginal bleeding, or leaking fluids. Good fetal movement.  Patient has follow-up ultrasound next week of fetal heart due to inadequate view previously  Questions answered.    O: LMP 2020 (Approximate)   Patients' weight gain, fluid intake and exercise level discussed.  Vitals, fundal height , fetal position, and FHR reviewed on flowsheet    Lab:No results found for this or any previous visit (from the past 336 hour(s)).    A/P:  26 y.o.  at 24w4d presents for routine obstetric follow-up.  Doing well  Size equals dates.  We discussed car seat, choosing pediatrician and breast-feeding.  She may need breast pump prescription at follow-up visit  Encounter Diagnosis   Name Primary?   • Supervision of normal first pregnancy in second trimester        1.  Continue prenatal vitamins.  2.  Begin kick counts at 28 weeks.  3.  Exercise at least 30 minutes daily.  4.  Drink at least 2 Liters of water daily  5.  Follow-up in 4 weeks.  6.  28-week lab slip given to patient with instructions  7.  Pregnancy precautions and plan of care reviewed

## 2021-01-21 ENCOUNTER — HOSPITAL ENCOUNTER (OUTPATIENT)
Dept: RADIOLOGY | Facility: MEDICAL CENTER | Age: 26
End: 2021-01-21
Attending: NURSE PRACTITIONER
Payer: COMMERCIAL

## 2021-01-21 DIAGNOSIS — Z34.80 SUPERVISION OF OTHER NORMAL PREGNANCY, ANTEPARTUM: ICD-10-CM

## 2021-01-21 PROCEDURE — 76815 OB US LIMITED FETUS(S): CPT

## 2021-02-10 ENCOUNTER — HOSPITAL ENCOUNTER (OUTPATIENT)
Dept: LAB | Facility: MEDICAL CENTER | Age: 26
End: 2021-02-10
Attending: OBSTETRICS & GYNECOLOGY
Payer: COMMERCIAL

## 2021-02-10 DIAGNOSIS — Z34.02 SUPERVISION OF NORMAL FIRST PREGNANCY IN SECOND TRIMESTER: ICD-10-CM

## 2021-02-10 LAB
BASOPHILS # BLD AUTO: 0.3 % (ref 0–1.8)
BASOPHILS # BLD: 0.03 K/UL (ref 0–0.12)
EOSINOPHIL # BLD AUTO: 0.05 K/UL (ref 0–0.51)
EOSINOPHIL NFR BLD: 0.4 % (ref 0–6.9)
ERYTHROCYTE [DISTWIDTH] IN BLOOD BY AUTOMATED COUNT: 44.5 FL (ref 35.9–50)
GLUCOSE 1H P 50 G GLC PO SERPL-MCNC: 131 MG/DL (ref 70–139)
HCT VFR BLD AUTO: 40.6 % (ref 37–47)
HGB BLD-MCNC: 13.5 G/DL (ref 12–16)
IMM GRANULOCYTES # BLD AUTO: 0.05 K/UL (ref 0–0.11)
IMM GRANULOCYTES NFR BLD AUTO: 0.4 % (ref 0–0.9)
LYMPHOCYTES # BLD AUTO: 1.94 K/UL (ref 1–4.8)
LYMPHOCYTES NFR BLD: 16.9 % (ref 22–41)
MCH RBC QN AUTO: 29.7 PG (ref 27–33)
MCHC RBC AUTO-ENTMCNC: 33.3 G/DL (ref 33.6–35)
MCV RBC AUTO: 89.2 FL (ref 81.4–97.8)
MONOCYTES # BLD AUTO: 0.47 K/UL (ref 0–0.85)
MONOCYTES NFR BLD AUTO: 4.1 % (ref 0–13.4)
NEUTROPHILS # BLD AUTO: 8.91 K/UL (ref 2–7.15)
NEUTROPHILS NFR BLD: 77.9 % (ref 44–72)
NRBC # BLD AUTO: 0 K/UL
NRBC BLD-RTO: 0 /100 WBC
PLATELET # BLD AUTO: 206 K/UL (ref 164–446)
PMV BLD AUTO: 10.3 FL (ref 9–12.9)
RBC # BLD AUTO: 4.55 M/UL (ref 4.2–5.4)
TREPONEMA PALLIDUM IGG+IGM AB [PRESENCE] IN SERUM OR PLASMA BY IMMUNOASSAY: NORMAL
WBC # BLD AUTO: 11.5 K/UL (ref 4.8–10.8)

## 2021-02-10 PROCEDURE — 82950 GLUCOSE TEST: CPT

## 2021-02-10 PROCEDURE — 36415 COLL VENOUS BLD VENIPUNCTURE: CPT

## 2021-02-10 PROCEDURE — 86780 TREPONEMA PALLIDUM: CPT

## 2021-02-10 PROCEDURE — 85025 COMPLETE CBC W/AUTO DIFF WBC: CPT

## 2021-02-11 ENCOUNTER — ROUTINE PRENATAL (OUTPATIENT)
Dept: OBGYN | Facility: CLINIC | Age: 26
End: 2021-02-11
Payer: COMMERCIAL

## 2021-02-11 VITALS — SYSTOLIC BLOOD PRESSURE: 126 MMHG | WEIGHT: 192 LBS | BODY MASS INDEX: 38.78 KG/M2 | DIASTOLIC BLOOD PRESSURE: 64 MMHG

## 2021-02-11 DIAGNOSIS — Z34.02 ENCOUNTER FOR SUPERVISION OF NORMAL FIRST PREGNANCY IN SECOND TRIMESTER: ICD-10-CM

## 2021-02-11 PROCEDURE — 90040 PR PRENATAL FOLLOW UP: CPT | Performed by: OBSTETRICS & GYNECOLOGY

## 2021-02-11 PROCEDURE — 90715 TDAP VACCINE 7 YRS/> IM: CPT | Performed by: OBSTETRICS & GYNECOLOGY

## 2021-02-11 PROCEDURE — 90471 IMMUNIZATION ADMIN: CPT | Performed by: OBSTETRICS & GYNECOLOGY

## 2021-02-11 NOTE — LETTER
"Count Your Baby's Movements  Another step to a healthy delivery    Nelida KEENAN             Dept: [phone]    How Many Weeks Pregnant? 28w3d    Date to Begin Countin2021              How to use this chart    One way for your physician to keep track of your baby's health is by knowing how often the baby moves (or \"kicks\") in your womb.  You can help your physician to do this by using this chart every day.    Every day, you should see how many hours it takes for your baby to move 10 times.  Start in the morning, as soon as you get up.    · First, write down the time your baby moves until you get to 10.  · Check off one box every time your baby moves until you get to 10.  · Write down the time you finished counting in the last column.  · Total how long it took to count up all 10 movements.  · Finally, fill in the box that shows how long this took.  After counting 10 movements, you no longer have to count any more that day.  The next morning, just start counting again as soon as you get up.    What should you call a \"movement\"?  It is hard to say, because it will feel different from one mother to another and from one pregnancy to the next.  The important thing is that you count the movements the same way throughout your pregnancy.  If you have more questions, you should ask your physician.    Count carefully every day!  SAMPLE:  Week 28    How many hours did it take to feel 10 movements?       Start  Time     1     2     3     4     5     6     7     8     9     10   Finish Time   Mon 8:20 ·  ·  ·  ·  ·  ·  ·  ·  ·  ·  11:40                  Sat               Sun                 IMPORTANT: You should contact your physician if it takes more than two hours for you to feel 10 movements.  Each morning, write down the time and start to count the movements of your baby.  Keep track by checking off one box every time you feel one movement.  When you have felt " "10 \"kicks\", write down the time you finished counting in the last column.  Then fill in the   box (over the check elizabeth) for the number of hours it took.  Be sure to read the complete instructions on the previous page.            "

## 2021-02-11 NOTE — PROGRESS NOTES
CRESENCIO:  28w3d    Pt reports doing well.  Denies vaginal bleeding, contractions, LOF.  Reports +FM.  Had leg cramp the other day; otherwise no concerns.    Wt 87.1 kg (192 lb)   LMP 2020 (Approximate)   BMI 38.78 kg/m²    BP: 126/64  gen: AAO, NAD  FHTs: 150  FH: 29    A/P: 26 y.o.  @ 28w3d      Estimated Date of Delivery: 5/3/21 by lmp c/w 12wk US  Rh+/-, RI; pnl wnl, + THC on UDS  QS neg    Anatomy US: wnl, limited cardiac view   [x] f/u US with visualized cardiac anatomy    1hr 131; 3rd tri labs wnl    [ ] flu  [ ] Tdap    [ ] GBS        Tdap today        RTC 2wks    Aziza Conner MD  [facility], Women's Health

## 2021-02-25 ENCOUNTER — ROUTINE PRENATAL (OUTPATIENT)
Dept: OBGYN | Facility: CLINIC | Age: 26
End: 2021-02-25
Payer: COMMERCIAL

## 2021-02-25 VITALS — BODY MASS INDEX: 38.58 KG/M2 | WEIGHT: 191 LBS | DIASTOLIC BLOOD PRESSURE: 69 MMHG | SYSTOLIC BLOOD PRESSURE: 135 MMHG

## 2021-02-25 DIAGNOSIS — Z34.03 SUPERVISION OF NORMAL FIRST PREGNANCY IN THIRD TRIMESTER: ICD-10-CM

## 2021-02-25 DIAGNOSIS — O99.213 OBESITY AFFECTING PREGNANCY IN THIRD TRIMESTER: ICD-10-CM

## 2021-02-25 PROCEDURE — 90040 PR PRENATAL FOLLOW UP: CPT | Performed by: OBSTETRICS & GYNECOLOGY

## 2021-02-25 RX ORDER — BREAST PUMP
EACH MISCELLANEOUS
Qty: 1 EACH | Refills: 0 | Status: SHIPPED | OUTPATIENT
Start: 2021-02-25 | End: 2022-03-25

## 2021-02-25 NOTE — PROGRESS NOTES
OB follow up   + fetal movement.  No VB, LOF or UC's.  Phone # [phone]  Preferred pharmacy confirmed  Pt reports no problems

## 2021-02-25 NOTE — PROGRESS NOTES
S: Pt presents for routine OB follow up.  Patient is doing well without any complaints or concerns.  Reports good fetal movement.  Denies headaches or scotoma.  Reports normal bowel and bladder functions  No contractions, vaginal bleeding, or leakage of fluid.  We discussed breast-feeding, car seat and choosing a pediatrician, prescription for breast pump given.  Pediatrician list provided  Questions answered.    O: LMP 2020 (Approximate)   Patients' weight gain, fluid intake and exercise level discussed.  Vitals, fundal height , fetal position, and FHR reviewed on flowsheet    Lab: 28-week labs are within normal limits    A/P:  26 y.o.  at 30w3d presents for routine obstetric follow-up.  Doing well  Size equals dates     1.  Continue prenatal vitamins.  2.  Fetal kick counts daily discussed.  3.  Exercise at least 30 minutes daily.  4.  Drink at least 2L of water daily  5.  Pregnancy and  labor precautions educated.  6.  Follow-up in 2 weeks.  7.  GBS culture at 36 weeks discussed

## 2021-03-11 ENCOUNTER — ROUTINE PRENATAL (OUTPATIENT)
Dept: OBGYN | Facility: CLINIC | Age: 26
End: 2021-03-11
Payer: COMMERCIAL

## 2021-03-11 VITALS — DIASTOLIC BLOOD PRESSURE: 74 MMHG | WEIGHT: 195 LBS | BODY MASS INDEX: 39.39 KG/M2 | SYSTOLIC BLOOD PRESSURE: 133 MMHG

## 2021-03-11 DIAGNOSIS — Z34.03 SUPERVISION OF NORMAL FIRST PREGNANCY IN THIRD TRIMESTER: ICD-10-CM

## 2021-03-11 PROCEDURE — 90040 PR PRENATAL FOLLOW UP: CPT | Performed by: OBSTETRICS & GYNECOLOGY

## 2021-03-11 NOTE — PROGRESS NOTES
Pt here today for OB follow up  Pt states no VB or LOF   Reports +FM  Good # [phone]   Pharmacy Confirmed.

## 2021-03-11 NOTE — PROGRESS NOTES
Chief complaint: Return visit    S: Pt presents for routine OB follow up. Good fetal movement.  No contractions, vaginal bleeding, or leakage of fluid.    Questions answered.    O: /74   Wt 88.5 kg (195 lb)   LMP 2020 (Approximate)   BMI 39.39 kg/m²   Patients' weight gain, fluid intake and exercise level discussed.  Vitals, fundal height , fetal position, and FHR reviewed on flowsheet    Lab:No results found for this or any previous visit (from the past 336 hour(s)).    A/P:  26 y.o.  at 32w3d presents for routine obstetric follow-up.  Size equals dates and/or scan    1.  Continue prenatal vitamins.  2.  Fetal kick counts.  3.  Exercise at least 30 minutes daily.  4.  Drink at least 2L of water daily  5.  Labor precautions educated.  6.  Follow-up in 2 weeks.  7.  GBS at 36 weeks    All questions answered    Monitor fundal height and pressure.

## 2021-03-25 ENCOUNTER — ROUTINE PRENATAL (OUTPATIENT)
Dept: OBGYN | Facility: CLINIC | Age: 26
End: 2021-03-25
Payer: COMMERCIAL

## 2021-03-25 VITALS — SYSTOLIC BLOOD PRESSURE: 129 MMHG | BODY MASS INDEX: 39.18 KG/M2 | WEIGHT: 194 LBS | DIASTOLIC BLOOD PRESSURE: 72 MMHG

## 2021-03-25 DIAGNOSIS — O99.213 OBESITY AFFECTING PREGNANCY IN THIRD TRIMESTER: ICD-10-CM

## 2021-03-25 PROCEDURE — 90040 PR PRENATAL FOLLOW UP: CPT | Performed by: OBSTETRICS & GYNECOLOGY

## 2021-03-25 NOTE — NON-PROVIDER
OB follow up   + fetal movement.  No VB, LOF or UC's.  Phone # [phone]  Preferred pharmacy confirmed.

## 2021-03-25 NOTE — PROGRESS NOTES
S: Pt presents for routine OB follow up.  No contractions, vaginal bleeding, or leaking fluids. Good fetal movement.  No complaints. Feeling more tired after her shifts but otherwise feeling well.     O: /72   Wt 88 kg (194 lb)   LMP 2020 (Approximate)   BMI 39.18 kg/m²   Vitals:    21 0913   BP: 129/72   Weight: 88 kg (194 lb)     Vitals, fundal height , fetal position, and FHR reviewed on flowsheet    Patient Active Problem List   Diagnosis   • Positive urine drug screen-cannibinoids   • Supervision of normal first pregnancy in third trimester   • Obesity affecting pregnancy in third trimester       Lab:  Recent Labs     20  1257 20  1300   ABOGROUP  --  AB   RUBELLAIGG 10.20  --    HEPBSAG Non-Reactive  --        A/P:  26 y.o.  at 34w3d presents for routine obstetric follow-up.     #Prenatal care  - Continue prenatal vitamins.  Estimated Date of Delivery: 5/3/21 by lmp c/w 12wk US  Rh+/-, RI; pnl wnl, + THC on UDS  QS neg    Anatomy US: wnl, limited cardiac view   [x] f/u US with visualized cardiac anatomy    Early marijuana use-stopped    1hr 131; 3rd tri labs wnl    [ ] flu  [x] Tdap 2021    [ ] GBS          - Follow-up: 2 weeks for GBS

## 2021-04-09 ENCOUNTER — ROUTINE PRENATAL (OUTPATIENT)
Dept: OBGYN | Facility: CLINIC | Age: 26
End: 2021-04-09
Payer: COMMERCIAL

## 2021-04-09 ENCOUNTER — HOSPITAL ENCOUNTER (OUTPATIENT)
Facility: MEDICAL CENTER | Age: 26
End: 2021-04-09
Attending: OBSTETRICS & GYNECOLOGY
Payer: COMMERCIAL

## 2021-04-09 VITALS — BODY MASS INDEX: 39.59 KG/M2 | DIASTOLIC BLOOD PRESSURE: 83 MMHG | SYSTOLIC BLOOD PRESSURE: 139 MMHG | WEIGHT: 196 LBS

## 2021-04-09 DIAGNOSIS — O99.213 OBESITY AFFECTING PREGNANCY IN THIRD TRIMESTER: ICD-10-CM

## 2021-04-09 PROCEDURE — 87081 CULTURE SCREEN ONLY: CPT

## 2021-04-09 PROCEDURE — 87150 DNA/RNA AMPLIFIED PROBE: CPT

## 2021-04-09 PROCEDURE — 90040 PR PRENATAL FOLLOW UP: CPT | Performed by: OBSTETRICS & GYNECOLOGY

## 2021-04-09 NOTE — NON-PROVIDER
OB follow up   + fetal movement.  No VB, LOF   Phone # [phone]  Preferred pharmacy confirmed.  GBS today  C/o cramping

## 2021-04-09 NOTE — PROGRESS NOTES
S: Pt presents for routine OB follow up.  No contractions, vaginal bleeding, or leaking fluids. Good fetal movement.  She states that she gets a lot of walking and at work and has cramping at the end of the day and is very tired.  This is not bothering her right now but she is concerned that this may have an adverse effect on her pregnancy.  She is not lifting anything heavy, just walking.  O: /83   Wt 88.9 kg (196 lb)   LMP 2020 (Approximate)   BMI 39.59 kg/m²   Vitals:    21 0834   BP: 139/83   Weight: 88.9 kg (196 lb)     Vitals, fundal height , fetal position, and FHR reviewed on flowsheet    Patient Active Problem List   Diagnosis   • Positive urine drug screen-cannibinoids   • Supervision of normal first pregnancy in third trimester   • Obesity affecting pregnancy in third trimester       Lab:  Recent Labs     20  1257 20  1300   ABOGROUP  --  AB   RUBELLAIGG 10.20  --    HEPBSAG Non-Reactive  --        A/P:  26 y.o.  at 36w4d presents for routine obstetric follow-up.     #Prenatal care  - Continue prenatal vitamins.  Estimated Date of Delivery: 5/3/21 by lmp c/w 12wk US  Rh+/-, RI; pnl wnl, + THC on UDS  QS neg    Anatomy US: wnl, limited cardiac view   [x] f/u US with visualized cardiac anatomy    Early marijuana use-stopped    1hr 131; 3rd tri labs wnl    [ ] flu  [x] Tdap 2021    [ ] GBS        -We discussed that walking and at good cardiovascular health is beneficial in pregnancy.  I advised that there is no contraindication to exercise or walking during pregnancy.  If patient desires a work note, we may facilitate this for her and place her on restricted activity at work as needed.  Patient will see how she does this next week and then let the office know.  - GBS today  - Follow-up: 1 week

## 2021-04-11 LAB — GP B STREP DNA SPEC QL NAA+PROBE: NEGATIVE

## 2021-04-16 ENCOUNTER — ROUTINE PRENATAL (OUTPATIENT)
Dept: OBGYN | Facility: CLINIC | Age: 26
End: 2021-04-16
Payer: COMMERCIAL

## 2021-04-16 VITALS — DIASTOLIC BLOOD PRESSURE: 74 MMHG | WEIGHT: 197 LBS | SYSTOLIC BLOOD PRESSURE: 127 MMHG | BODY MASS INDEX: 39.79 KG/M2

## 2021-04-16 DIAGNOSIS — Z34.03 ENCOUNTER FOR SUPERVISION OF NORMAL FIRST PREGNANCY IN THIRD TRIMESTER: ICD-10-CM

## 2021-04-16 PROCEDURE — 90040 PR PRENATAL FOLLOW UP: CPT | Performed by: OBSTETRICS & GYNECOLOGY

## 2021-04-16 NOTE — PROGRESS NOTES
CRESENCIO:  37w4d    Pt reports doing well.  Denies vaginal bleeding, contractions, LOF.  Reports +FM.      /74   Wt 89.4 kg (197 lb)   LMP 2020 (Approximate)   BMI 39.79 kg/m²   gen: AAO, NAD  FHTs: 140  FH: 38    A/P: 26 y.o.  @ 37w4d      Estimated Date of Delivery: 5/3/21 by lmp c/w 12wk US  Rh+/-, RI; pnl wnl, + THC on UDS  QS neg    Anatomy US: wnl, limited cardiac view   [x] f/u US with visualized cardiac anatomy    Early marijuana use-stopped    1hr 131; 3rd tri labs wnl    [ ] flu  [x] Tdap 2021    [x] GBS neg      considering pills  Desires to BF      Reviewed labor precautions (to call or come to hospital for ctx q5min, VB, LOF, decreased FM)    RTC 1wks    Aziza Conner MD  [facility], Women's Health

## 2021-04-16 NOTE — PROGRESS NOTES
Pt here today for OB follow up @ 37w4d  Pt states denies VB and LOF  Reports +FM  Good # [phone]   Pharmacy Confirmed.

## 2021-04-23 ENCOUNTER — ROUTINE PRENATAL (OUTPATIENT)
Dept: OBGYN | Facility: CLINIC | Age: 26
End: 2021-04-23
Payer: COMMERCIAL

## 2021-04-23 VITALS — BODY MASS INDEX: 39.39 KG/M2 | DIASTOLIC BLOOD PRESSURE: 71 MMHG | WEIGHT: 195 LBS | SYSTOLIC BLOOD PRESSURE: 122 MMHG

## 2021-04-23 DIAGNOSIS — Z34.03 ENCOUNTER FOR SUPERVISION OF NORMAL FIRST PREGNANCY IN THIRD TRIMESTER: ICD-10-CM

## 2021-04-23 PROCEDURE — 90040 PR PRENATAL FOLLOW UP: CPT | Performed by: OBSTETRICS & GYNECOLOGY

## 2021-04-23 NOTE — PROGRESS NOTES
Pt here today for OB follow up  Pt states no VB or LOF   Reports +FM  Good # [phone]   Pharmacy Confirmed.   Chaperone offered and provided.   GBS negative pt aware

## 2021-04-23 NOTE — PROGRESS NOTES
OB Followup;    38w4d    Patient Active Problem List    Diagnosis Date Noted   • Obesity affecting pregnancy in third trimester 2021   • Supervision of normal first pregnancy in third trimester 01/15/2021   • Positive urine drug screen-cannibinoids 2020       Vitals:    21 0955 21 0957   BP: 132/76 122/71   Weight: 88.5 kg (195 lb)        Patient presents for followup of OB care. Currently doing well . Good fetal movement no leakage of fluid no contractions or vaginal bleeding        Size equals dates, normal fetal heart rate    Patient would like to put off induction until she is couple days past her due date at the earliest  Referral placed for induction of labor at 40 2/7 weeks    Labor precautions given  Discussed proper weight gain during pregnancy.    Signs and symptoms of labor/ labor discussed   Discussed our group's policy on prenatal checkups and delivery  Discussed Covid precautions  Discussed Covid vaccination recommendations from CDC/ACOG  Discussed proper exercise during pregnancy  Discussed proper oral fluid hydration  Currently taking prenatal vitamins as instructed  Reviewed fetal kick counts and appropriate fetal movement during pregnancy  Reviewed postpartum birth control methods  All questions answered in detail    Followup in  1 weeks

## 2021-04-30 ENCOUNTER — ROUTINE PRENATAL (OUTPATIENT)
Dept: OBGYN | Facility: CLINIC | Age: 26
End: 2021-04-30
Payer: COMMERCIAL

## 2021-04-30 VITALS — SYSTOLIC BLOOD PRESSURE: 115 MMHG | DIASTOLIC BLOOD PRESSURE: 85 MMHG | WEIGHT: 193 LBS | BODY MASS INDEX: 38.98 KG/M2

## 2021-04-30 DIAGNOSIS — Z34.03 SUPERVISION OF NORMAL FIRST PREGNANCY IN THIRD TRIMESTER: ICD-10-CM

## 2021-04-30 DIAGNOSIS — O99.213 OBESITY AFFECTING PREGNANCY IN THIRD TRIMESTER: ICD-10-CM

## 2021-04-30 PROCEDURE — 90040 PR PRENATAL FOLLOW UP: CPT | Performed by: OBSTETRICS & GYNECOLOGY

## 2021-04-30 NOTE — PROGRESS NOTES
S: Pt presents for routine OB follow up.  Patient is doing well without any complaints or concerns.  Reports good fetal movement.  Denies headaches or scotoma.  Has normal bowel and bladder functions  No contractions, vaginal bleeding, or leakage of fluid.  Patient has induction of labor scheduled next week Friday.  Does not want cervical check today  Questions answered.    O: /85   Wt 87.5 kg (193 lb)   LMP 2020 (Approximate)   BMI 38.98 kg/m²   Patients' weight gain, fluid intake and exercise level discussed.  Vitals, fundal height , fetal position, and FHR reviewed on flowsheet    Lab:No results found for this or any previous visit (from the past 336 hour(s)).    A/P:  26 y.o.  at 39w4d presents for routine obstetric follow-up.  Doing well currently  Size equals dates .  I reviewed induction of labor process and pain management in labor  Encounter Diagnoses   Name Primary?   • Supervision of normal first pregnancy in third trimester    • Obesity affecting pregnancy in third trimester        1.  Continue prenatal vitamins.  2.  Fetal kick counts daily reviewed.  3.  Exercise at least 30 minutes daily.  4.  Drink at least 2L of water daily  5.  Pregnancy and labor precautions educated.  6.  Follow-up in 1 week  7.  GBS culture is negative

## 2021-04-30 NOTE — NON-PROVIDER
OB follow up   + fetal movement.  No VB, LOF or UC's.  Phone # [phone]  Preferred pharmacy confirmed.  IOL 05/17/2021  GBS negative

## 2021-05-04 ENCOUNTER — HOSPITAL ENCOUNTER (OUTPATIENT)
Dept: OBGYN | Facility: MEDICAL CENTER | Age: 26
End: 2021-05-04
Attending: OBSTETRICS & GYNECOLOGY
Payer: COMMERCIAL

## 2021-05-04 LAB
SARS-COV-2 RNA RESP QL NAA+PROBE: NOTDETECTED
SPECIMEN SOURCE: NORMAL

## 2021-05-04 PROCEDURE — U0005 INFEC AGEN DETEC AMPLI PROBE: HCPCS

## 2021-05-04 PROCEDURE — U0003 INFECTIOUS AGENT DETECTION BY NUCLEIC ACID (DNA OR RNA); SEVERE ACUTE RESPIRATORY SYNDROME CORONAVIRUS 2 (SARS-COV-2) (CORONAVIRUS DISEASE [COVID-19]), AMPLIFIED PROBE TECHNIQUE, MAKING USE OF HIGH THROUGHPUT TECHNOLOGIES AS DESCRIBED BY CMS-2020-01-R: HCPCS

## 2021-05-04 NOTE — PROGRESS NOTES
Pt presents to L&D for preadmission COVID swab. POC discussed. PT denies any s/s or exposure to COVID. Pt discharged home with self isolation instructions.

## 2021-05-07 ENCOUNTER — HOSPITAL ENCOUNTER (INPATIENT)
Facility: MEDICAL CENTER | Age: 26
LOS: 1 days | End: 2021-05-09
Attending: OBSTETRICS & GYNECOLOGY | Admitting: OBSTETRICS & GYNECOLOGY
Payer: COMMERCIAL

## 2021-05-07 ENCOUNTER — APPOINTMENT (OUTPATIENT)
Dept: OBGYN | Facility: MEDICAL CENTER | Age: 26
End: 2021-05-07
Attending: OBSTETRICS & GYNECOLOGY
Payer: COMMERCIAL

## 2021-05-07 LAB
BASOPHILS # BLD AUTO: 0.2 % (ref 0–1.8)
BASOPHILS # BLD: 0.02 K/UL (ref 0–0.12)
EOSINOPHIL # BLD AUTO: 0.07 K/UL (ref 0–0.51)
EOSINOPHIL NFR BLD: 0.5 % (ref 0–6.9)
ERYTHROCYTE [DISTWIDTH] IN BLOOD BY AUTOMATED COUNT: 41.9 FL (ref 35.9–50)
HCT VFR BLD AUTO: 40.6 % (ref 37–47)
HGB BLD-MCNC: 13.7 G/DL (ref 12–16)
HOLDING TUBE BB 8507: NORMAL
IMM GRANULOCYTES # BLD AUTO: 0.07 K/UL (ref 0–0.11)
IMM GRANULOCYTES NFR BLD AUTO: 0.5 % (ref 0–0.9)
LYMPHOCYTES # BLD AUTO: 2.53 K/UL (ref 1–4.8)
LYMPHOCYTES NFR BLD: 19.5 % (ref 22–41)
MCH RBC QN AUTO: 29.6 PG (ref 27–33)
MCHC RBC AUTO-ENTMCNC: 33.7 G/DL (ref 33.6–35)
MCV RBC AUTO: 87.7 FL (ref 81.4–97.8)
MONOCYTES # BLD AUTO: 0.67 K/UL (ref 0–0.85)
MONOCYTES NFR BLD AUTO: 5.2 % (ref 0–13.4)
NEUTROPHILS # BLD AUTO: 9.59 K/UL (ref 2–7.15)
NEUTROPHILS NFR BLD: 74.1 % (ref 44–72)
NRBC # BLD AUTO: 0 K/UL
NRBC BLD-RTO: 0 /100 WBC
PLATELET # BLD AUTO: 185 K/UL (ref 164–446)
PMV BLD AUTO: 11.1 FL (ref 9–12.9)
RBC # BLD AUTO: 4.63 M/UL (ref 4.2–5.4)
WBC # BLD AUTO: 13 K/UL (ref 4.8–10.8)

## 2021-05-07 PROCEDURE — 700105 HCHG RX REV CODE 258: Performed by: NURSE PRACTITIONER

## 2021-05-07 PROCEDURE — 85025 COMPLETE CBC W/AUTO DIFF WBC: CPT

## 2021-05-07 PROCEDURE — 700111 HCHG RX REV CODE 636 W/ 250 OVERRIDE (IP)

## 2021-05-07 PROCEDURE — 700102 HCHG RX REV CODE 250 W/ 637 OVERRIDE(OP): Performed by: OBSTETRICS & GYNECOLOGY

## 2021-05-07 PROCEDURE — 36415 COLL VENOUS BLD VENIPUNCTURE: CPT

## 2021-05-07 PROCEDURE — A9270 NON-COVERED ITEM OR SERVICE: HCPCS | Performed by: OBSTETRICS & GYNECOLOGY

## 2021-05-07 RX ORDER — SODIUM CHLORIDE, SODIUM LACTATE, POTASSIUM CHLORIDE, CALCIUM CHLORIDE 600; 310; 30; 20 MG/100ML; MG/100ML; MG/100ML; MG/100ML
INJECTION, SOLUTION INTRAVENOUS CONTINUOUS
Status: ACTIVE | OUTPATIENT
Start: 2021-05-07 | End: 2021-05-07

## 2021-05-07 RX ORDER — ALUMINA, MAGNESIA, AND SIMETHICONE 2400; 2400; 240 MG/30ML; MG/30ML; MG/30ML
30 SUSPENSION ORAL EVERY 6 HOURS PRN
Status: DISCONTINUED | OUTPATIENT
Start: 2021-05-07 | End: 2021-05-08 | Stop reason: HOSPADM

## 2021-05-07 RX ORDER — ROPIVACAINE HYDROCHLORIDE 2 MG/ML
INJECTION, SOLUTION EPIDURAL; INFILTRATION; PERINEURAL
Status: COMPLETED
Start: 2021-05-07 | End: 2021-05-08

## 2021-05-07 RX ORDER — SODIUM CHLORIDE, SODIUM LACTATE, POTASSIUM CHLORIDE, CALCIUM CHLORIDE 600; 310; 30; 20 MG/100ML; MG/100ML; MG/100ML; MG/100ML
INJECTION, SOLUTION INTRAVENOUS CONTINUOUS
Status: DISCONTINUED | OUTPATIENT
Start: 2021-05-07 | End: 2021-05-09

## 2021-05-07 RX ORDER — CITRIC ACID/SODIUM CITRATE 334-500MG
30 SOLUTION, ORAL ORAL EVERY 6 HOURS PRN
Status: DISCONTINUED | OUTPATIENT
Start: 2021-05-07 | End: 2021-05-08 | Stop reason: HOSPADM

## 2021-05-07 RX ORDER — OXYTOCIN 10 [USP'U]/ML
10 INJECTION, SOLUTION INTRAMUSCULAR; INTRAVENOUS
Status: DISCONTINUED | OUTPATIENT
Start: 2021-05-07 | End: 2021-05-08 | Stop reason: HOSPADM

## 2021-05-07 RX ADMIN — SODIUM CHLORIDE, POTASSIUM CHLORIDE, SODIUM LACTATE AND CALCIUM CHLORIDE: 600; 310; 30; 20 INJECTION, SOLUTION INTRAVENOUS at 21:37

## 2021-05-07 RX ADMIN — OXYTOCIN 2 MILLI-UNITS/MIN: 10 INJECTION, SOLUTION INTRAMUSCULAR; INTRAVENOUS at 21:36

## 2021-05-07 RX ADMIN — MISOPROSTOL 25 MCG: 100 TABLET ORAL at 15:05

## 2021-05-07 ASSESSMENT — PATIENT HEALTH QUESTIONNAIRE - PHQ9
SUM OF ALL RESPONSES TO PHQ9 QUESTIONS 1 AND 2: 0
1. LITTLE INTEREST OR PLEASURE IN DOING THINGS: NOT AT ALL
2. FEELING DOWN, DEPRESSED, IRRITABLE, OR HOPELESS: NOT AT ALL
SUM OF ALL RESPONSES TO PHQ9 QUESTIONS 1 AND 2: 0
1. LITTLE INTEREST OR PLEASURE IN DOING THINGS: NOT AT ALL
2. FEELING DOWN, DEPRESSED, IRRITABLE, OR HOPELESS: NOT AT ALL

## 2021-05-07 ASSESSMENT — LIFESTYLE VARIABLES
TOTAL SCORE: 0
EVER_SMOKED: YES
EVER FELT BAD OR GUILTY ABOUT YOUR DRINKING: NO
DOES PATIENT WANT TO STOP DRINKING: NO
HAVE PEOPLE ANNOYED YOU BY CRITICIZING YOUR DRINKING: NO
CONSUMPTION TOTAL: NEGATIVE
ON A TYPICAL DAY WHEN YOU DRINK ALCOHOL HOW MANY DRINKS DO YOU HAVE: 0
AVERAGE NUMBER OF DAYS PER WEEK YOU HAVE A DRINK CONTAINING ALCOHOL: 0
ALCOHOL_USE: NO
HOW MANY TIMES IN THE PAST YEAR HAVE YOU HAD 5 OR MORE DRINKS IN A DAY: 0
EVER HAD A DRINK FIRST THING IN THE MORNING TO STEADY YOUR NERVES TO GET RID OF A HANGOVER: NO
TOTAL SCORE: 0
TOTAL SCORE: 0
HAVE YOU EVER FELT YOU SHOULD CUT DOWN ON YOUR DRINKING: NO

## 2021-05-07 ASSESSMENT — PAIN DESCRIPTION - PAIN TYPE
TYPE: ACUTE PAIN

## 2021-05-07 NOTE — H&P
"OB H&P:    CC: Induction of labor for post-dates    HPI:  Ms. Nelida Morrell is a 26 y.o.  @ 40w4d by 12-week US here for scheduled induction of labor. She denies contractions, loss of fluid, vaginal bleeding. Confirms good fetal movement. Pregnancy has been without complications. Patient has no PMHx. She has received sufficient pre-pippa care and taken pre- vitamins. Denies EtOH use, smoking, drug use.    PNC with [facility]    PNL:wnl  Rh+, RI, HIV neg, TrepAb neg, HBsAg NR, GC/CT neg/neg  Glucola: 131  GBS Neg    ROS:  Const: denies fevers, general concerns  CV/resp: reports no concerns  GI: denies abd pain, GI concerns  : see HPI  Neuro: denies HA/vision changes    OB History    Para Term  AB Living   1             SAB TAB Ectopic Molar Multiple Live Births                    # Outcome Date GA Lbr Abdon/2nd Weight Sex Delivery Anes PTL Lv   1 Current                GYN: denies STIs, no cervical procedures    No past medical history on file.    No past surgical history on file.    No current facility-administered medications on file prior to encounter.     Current Outpatient Medications on File Prior to Encounter   Medication Sig Dispense Refill   • Misc. Devices (BREAST PUMP) Misc Breast pump with supplies (Patient not taking: Reported on 3/25/2021) 1 Each 0   • Prenatal Vit-Fe Fumarate-FA (PRENATAL 19 PO) Take  by mouth.     • metoclopramide (REGLAN) 10 MG Tab Take 1 Tab by mouth 4 times a day. (Patient not taking: Reported on 3/11/2021) 15 Tab 2   • oseltamivir (TAMIFLU) 75 MG Cap Take 1 Cap by mouth 2 times a day. (Patient not taking: Reported on 10/29/2020) 10 Cap 0       No family history on file.    Social History     Tobacco Use   • Smoking status: Never Smoker   • Smokeless tobacco: Never Used   Substance Use Topics   • Alcohol use: No   • Drug use: No         PE:  Vitals:    21 1000   Weight: 88 kg (194 lb)   Height: 1.499 m (4' 11\")     gen: AAO, NAD  abd: soft, gravid, NT, EFW " 3300  Ext: NT, no edema    SVE: FT/0/-3    FHT: 130/moderate variability/Pos accels/ Neg decels  [place]: no contractions    A/P: 26 y.o.  @ 40w4d by 12-week US here for scheduled induction of labor for post-dates. Pregnancy course has been uncomplicated. Patient without complaints at this time. Vitals are normal. No contractions yet. Mcwilliams's score not favorable.   - Will insert lam balloon for mechanical dilation  - Expectant management    Dispo: Admit for induction of labor        Zakia Tubbs MD  Family Medicine Resident, PGY-1

## 2021-05-07 NOTE — CARE PLAN
Problem: Knowledge Deficit  Goal: Patient/Support person demonstrates understanding regarding the progression of labor, available options and participates in decision-making process  Outcome: MET   POC discussed and pt states understanding, pt asks questions as they present.   Problem: Pain  Goal: Alleviation of Pain or a reduction in pain to the patient's comfort goal  Outcome: MET   Pt not having any pain yet, options discussed and pt will notify RN if she desires anything for pain.

## 2021-05-08 ENCOUNTER — ANESTHESIA EVENT (OUTPATIENT)
Dept: ANESTHESIOLOGY | Facility: MEDICAL CENTER | Age: 26
End: 2021-05-08
Payer: COMMERCIAL

## 2021-05-08 ENCOUNTER — ANESTHESIA (OUTPATIENT)
Dept: ANESTHESIOLOGY | Facility: MEDICAL CENTER | Age: 26
End: 2021-05-08
Payer: COMMERCIAL

## 2021-05-08 PROCEDURE — 700102 HCHG RX REV CODE 250 W/ 637 OVERRIDE(OP): Performed by: NURSE PRACTITIONER

## 2021-05-08 PROCEDURE — 700102 HCHG RX REV CODE 250 W/ 637 OVERRIDE(OP): Performed by: OBSTETRICS & GYNECOLOGY

## 2021-05-08 PROCEDURE — 700105 HCHG RX REV CODE 258: Performed by: NURSE PRACTITIONER

## 2021-05-08 PROCEDURE — 700111 HCHG RX REV CODE 636 W/ 250 OVERRIDE (IP): Performed by: STUDENT IN AN ORGANIZED HEALTH CARE EDUCATION/TRAINING PROGRAM

## 2021-05-08 PROCEDURE — 700102 HCHG RX REV CODE 250 W/ 637 OVERRIDE(OP): Performed by: STUDENT IN AN ORGANIZED HEALTH CARE EDUCATION/TRAINING PROGRAM

## 2021-05-08 PROCEDURE — 770002 HCHG ROOM/CARE - OB PRIVATE (112)

## 2021-05-08 PROCEDURE — 59400 OBSTETRICAL CARE: CPT | Performed by: OBSTETRICS & GYNECOLOGY

## 2021-05-08 PROCEDURE — 59409 OBSTETRICAL CARE: CPT

## 2021-05-08 PROCEDURE — 700101 HCHG RX REV CODE 250

## 2021-05-08 PROCEDURE — 0KQM0ZZ REPAIR PERINEUM MUSCLE, OPEN APPROACH: ICD-10-PCS | Performed by: OBSTETRICS & GYNECOLOGY

## 2021-05-08 PROCEDURE — 700101 HCHG RX REV CODE 250: Performed by: STUDENT IN AN ORGANIZED HEALTH CARE EDUCATION/TRAINING PROGRAM

## 2021-05-08 PROCEDURE — 700111 HCHG RX REV CODE 636 W/ 250 OVERRIDE (IP)

## 2021-05-08 PROCEDURE — 0UQMXZZ REPAIR VULVA, EXTERNAL APPROACH: ICD-10-PCS | Performed by: OBSTETRICS & GYNECOLOGY

## 2021-05-08 PROCEDURE — 3E033VJ INTRODUCTION OF OTHER HORMONE INTO PERIPHERAL VEIN, PERCUTANEOUS APPROACH: ICD-10-PCS | Performed by: OBSTETRICS & GYNECOLOGY

## 2021-05-08 PROCEDURE — 10907ZC DRAINAGE OF AMNIOTIC FLUID, THERAPEUTIC FROM PRODUCTS OF CONCEPTION, VIA NATURAL OR ARTIFICIAL OPENING: ICD-10-PCS | Performed by: OBSTETRICS & GYNECOLOGY

## 2021-05-08 PROCEDURE — A9270 NON-COVERED ITEM OR SERVICE: HCPCS | Performed by: STUDENT IN AN ORGANIZED HEALTH CARE EDUCATION/TRAINING PROGRAM

## 2021-05-08 PROCEDURE — 3E0P7VZ INTRODUCTION OF HORMONE INTO FEMALE REPRODUCTIVE, VIA NATURAL OR ARTIFICIAL OPENING: ICD-10-PCS | Performed by: OBSTETRICS & GYNECOLOGY

## 2021-05-08 PROCEDURE — A9270 NON-COVERED ITEM OR SERVICE: HCPCS | Performed by: NURSE PRACTITIONER

## 2021-05-08 PROCEDURE — A9270 NON-COVERED ITEM OR SERVICE: HCPCS | Performed by: OBSTETRICS & GYNECOLOGY

## 2021-05-08 PROCEDURE — 700105 HCHG RX REV CODE 258: Performed by: OBSTETRICS & GYNECOLOGY

## 2021-05-08 PROCEDURE — 304965 HCHG RECOVERY SERVICES

## 2021-05-08 PROCEDURE — 303615 HCHG EPIDURAL/SPINAL ANESTHESIA FOR LABOR

## 2021-05-08 RX ORDER — SODIUM CHLORIDE, SODIUM LACTATE, POTASSIUM CHLORIDE, AND CALCIUM CHLORIDE .6; .31; .03; .02 G/100ML; G/100ML; G/100ML; G/100ML
250 INJECTION, SOLUTION INTRAVENOUS PRN
Status: DISCONTINUED | OUTPATIENT
Start: 2021-05-08 | End: 2021-05-08

## 2021-05-08 RX ORDER — CALCIUM CARBONATE 500 MG/1
1000 TABLET, CHEWABLE ORAL EVERY 6 HOURS PRN
Status: DISCONTINUED | OUTPATIENT
Start: 2021-05-08 | End: 2021-05-09 | Stop reason: HOSPADM

## 2021-05-08 RX ORDER — LIDOCAINE HYDROCHLORIDE AND EPINEPHRINE 15; 5 MG/ML; UG/ML
INJECTION, SOLUTION EPIDURAL
Status: COMPLETED | OUTPATIENT
Start: 2021-05-08 | End: 2021-05-08

## 2021-05-08 RX ORDER — DOCUSATE SODIUM 100 MG/1
100 CAPSULE, LIQUID FILLED ORAL 2 TIMES DAILY PRN
Status: DISCONTINUED | OUTPATIENT
Start: 2021-05-08 | End: 2021-05-09 | Stop reason: HOSPADM

## 2021-05-08 RX ORDER — DEXTROSE, SODIUM CHLORIDE, SODIUM LACTATE, POTASSIUM CHLORIDE, AND CALCIUM CHLORIDE 5; .6; .31; .03; .02 G/100ML; G/100ML; G/100ML; G/100ML; G/100ML
INJECTION, SOLUTION INTRAVENOUS CONTINUOUS
Status: DISCONTINUED | OUTPATIENT
Start: 2021-05-08 | End: 2021-05-09

## 2021-05-08 RX ORDER — SODIUM CHLORIDE, SODIUM LACTATE, POTASSIUM CHLORIDE, AND CALCIUM CHLORIDE .6; .31; .03; .02 G/100ML; G/100ML; G/100ML; G/100ML
1000 INJECTION, SOLUTION INTRAVENOUS
Status: DISCONTINUED | OUTPATIENT
Start: 2021-05-08 | End: 2021-05-08

## 2021-05-08 RX ORDER — ROPIVACAINE HYDROCHLORIDE 2 MG/ML
INJECTION, SOLUTION EPIDURAL; INFILTRATION; PERINEURAL CONTINUOUS
Status: DISCONTINUED | OUTPATIENT
Start: 2021-05-08 | End: 2021-05-08

## 2021-05-08 RX ORDER — IBUPROFEN 600 MG/1
600 TABLET ORAL EVERY 6 HOURS PRN
Status: DISCONTINUED | OUTPATIENT
Start: 2021-05-08 | End: 2021-05-09 | Stop reason: HOSPADM

## 2021-05-08 RX ORDER — ROPIVACAINE HYDROCHLORIDE 2 MG/ML
INJECTION, SOLUTION EPIDURAL; INFILTRATION; PERINEURAL
Status: COMPLETED
Start: 2021-05-08 | End: 2021-05-08

## 2021-05-08 RX ORDER — ONDANSETRON 2 MG/ML
4 INJECTION INTRAMUSCULAR; INTRAVENOUS EVERY 6 HOURS PRN
Status: DISCONTINUED | OUTPATIENT
Start: 2021-05-08 | End: 2021-05-09 | Stop reason: HOSPADM

## 2021-05-08 RX ORDER — ACETAMINOPHEN 325 MG/1
325 TABLET ORAL EVERY 4 HOURS PRN
Status: DISCONTINUED | OUTPATIENT
Start: 2021-05-08 | End: 2021-05-09 | Stop reason: HOSPADM

## 2021-05-08 RX ORDER — SODIUM CHLORIDE, SODIUM LACTATE, POTASSIUM CHLORIDE, CALCIUM CHLORIDE 600; 310; 30; 20 MG/100ML; MG/100ML; MG/100ML; MG/100ML
INJECTION, SOLUTION INTRAVENOUS PRN
Status: DISCONTINUED | OUTPATIENT
Start: 2021-05-08 | End: 2021-05-09 | Stop reason: HOSPADM

## 2021-05-08 RX ORDER — BUPIVACAINE HYDROCHLORIDE 2.5 MG/ML
INJECTION, SOLUTION EPIDURAL; INFILTRATION; INTRACAUDAL
Status: ACTIVE
Start: 2021-05-08 | End: 2021-05-08

## 2021-05-08 RX ORDER — HYDROCODONE BITARTRATE AND ACETAMINOPHEN 10; 325 MG/1; MG/1
1 TABLET ORAL EVERY 4 HOURS PRN
Status: DISCONTINUED | OUTPATIENT
Start: 2021-05-08 | End: 2021-05-09 | Stop reason: HOSPADM

## 2021-05-08 RX ORDER — ONDANSETRON 4 MG/1
4 TABLET, ORALLY DISINTEGRATING ORAL EVERY 6 HOURS PRN
Status: DISCONTINUED | OUTPATIENT
Start: 2021-05-08 | End: 2021-05-09 | Stop reason: HOSPADM

## 2021-05-08 RX ORDER — VITAMIN A ACETATE, BETA CAROTENE, ASCORBIC ACID, CHOLECALCIFEROL, .ALPHA.-TOCOPHEROL ACETATE, DL-, THIAMINE MONONITRATE, RIBOFLAVIN, NIACINAMIDE, PYRIDOXINE HYDROCHLORIDE, FOLIC ACID, CYANOCOBALAMIN, CALCIUM CARBONATE, FERROUS FUMARATE, ZINC OXIDE, CUPRIC OXIDE 3080; 12; 120; 400; 1; 1.84; 3; 20; 22; 920; 25; 200; 27; 10; 2 [IU]/1; UG/1; MG/1; [IU]/1; MG/1; MG/1; MG/1; MG/1; MG/1; [IU]/1; MG/1; MG/1; MG/1; MG/1; MG/1
1 TABLET, FILM COATED ORAL
Status: DISCONTINUED | OUTPATIENT
Start: 2021-05-09 | End: 2021-05-09 | Stop reason: HOSPADM

## 2021-05-08 RX ORDER — LIDOCAINE HYDROCHLORIDE 10 MG/ML
INJECTION, SOLUTION INFILTRATION; PERINEURAL
Status: ACTIVE
Start: 2021-05-08 | End: 2021-05-09

## 2021-05-08 RX ADMIN — ROPIVACAINE HYDROCHLORIDE 200 MG: 2 INJECTION, SOLUTION EPIDURAL; INFILTRATION at 00:39

## 2021-05-08 RX ADMIN — SODIUM CHLORIDE, POTASSIUM CHLORIDE, SODIUM LACTATE AND CALCIUM CHLORIDE: 600; 310; 30; 20 INJECTION, SOLUTION INTRAVENOUS at 18:51

## 2021-05-08 RX ADMIN — LIDOCAINE HYDROCHLORIDE,EPINEPHRINE BITARTRATE 3 ML: 15; .005 INJECTION, SOLUTION EPIDURAL; INFILTRATION; INTRACAUDAL; PERINEURAL at 00:22

## 2021-05-08 RX ADMIN — ANTACID TABLETS 1000 MG: 500 TABLET, CHEWABLE ORAL at 06:51

## 2021-05-08 RX ADMIN — IBUPROFEN 600 MG: 600 TABLET ORAL at 18:51

## 2021-05-08 RX ADMIN — Medication 2000 ML/HR: at 17:00

## 2021-05-08 RX ADMIN — Medication 125 ML/HR: at 19:00

## 2021-05-08 RX ADMIN — ROPIVACAINE HYDROCHLORIDE 200 MG: 2 INJECTION, SOLUTION EPIDURAL; INFILTRATION at 08:28

## 2021-05-08 RX ADMIN — ROPIVACAINE HYDROCHLORIDE 200 MG: 2 INJECTION, SOLUTION EPIDURAL; INFILTRATION at 16:40

## 2021-05-08 RX ADMIN — EPHEDRINE SULFATE 5 MG: 50 INJECTION INTRAVENOUS at 05:22

## 2021-05-08 RX ADMIN — HYDROCODONE BITARTRATE AND ACETAMINOPHEN 1 TABLET: 10; 325 TABLET ORAL at 18:51

## 2021-05-08 RX ADMIN — SODIUM CHLORIDE, SODIUM LACTATE, POTASSIUM CHLORIDE, CALCIUM CHLORIDE AND DEXTROSE MONOHYDRATE 1000 ML: 5; 600; 310; 30; 20 INJECTION, SOLUTION INTRAVENOUS at 08:27

## 2021-05-08 RX ADMIN — BUPIVACAINE HYDROCHLORIDE 8 ML: 2.5 INJECTION, SOLUTION EPIDURAL; INFILTRATION; INTRACAUDAL; PERINEURAL at 00:22

## 2021-05-08 ASSESSMENT — PAIN DESCRIPTION - PAIN TYPE
TYPE: ACUTE PAIN

## 2021-05-08 ASSESSMENT — PAIN SCALES - GENERAL: PAIN_LEVEL: 0

## 2021-05-08 NOTE — PROGRESS NOTES
S: Pt called out for lam coming out in bathroom. Reports feeling more CTX     O:    Vitals:    05/07/21 1535 05/07/21 1550 05/07/21 1606 05/07/21 1909   BP: 121/68 123/72 125/71 122/76   Pulse: 80 70 76 85   Resp:    18   Temp:    36.2 °C (97.2 °F)   TempSrc:    Temporal   Weight:       Height:               FHTs:  Baseline 130, + accels, - decels, moderate variability        [place]: Contractions q 2-5 minutes, moderate to palpation,        SVE: 4/70/-3     A/P:  1.  IUP @ 40w4d            2.  Cat 1 FHTs             3.  Pitocin             4.  Will reassess as indicated    Kandis Muniz CNM, APRN

## 2021-05-08 NOTE — PROGRESS NOTES
0945  Pt into room 221 for IOL, pt has no report of bleeding or leaking and no report of UC's.  Monitors placed and POC discussed.  1000  IV and labs drawn, SVE and report to Dr. Jones, awaiting orders at this time.  1400  Dr. Moya in room, SVE and orders for cytotec placed at this time.  1500  Cytotec placed and pt supine for 1 hr.  1600  Pt reports some mild back pain, no report of UC's at this time.  1700  Pt reports mild cramping, does not desire any pain medications at this time.  1850  Report to NOC RN in room with pt and pt has no report of UC's, reports mild cramping.

## 2021-05-08 NOTE — PROGRESS NOTES
1900) Bedside report received from GIANFRANCO Beck RN, POC discussed, assumed care of patient at this time  1920) MARC Muniz CNM at bedside to see patient  1950) MRAC Muniz CNM at bedside, lam balloon placed with 30 cc inflated into the balloon. Patient educated concerning balloon placement and verbalizes understanding.  2035) Patient's balloon fell out  2122) MARC Muniz CNM at bedside, SVE: 4/70/-3, orders received to start pitocin, patient educated concerning pitocin augmentation and verbalizes understanding.   0021) Dr. Corona at bedside for epidural placement  0034) Test dose administered with no adverse effect  0125) Lam placed per protocol, SVE: 5/70/-2, patient repositioned in bed  0318) MARC Muniz CNM at bedside, SVE - 5/70/-2, AROM performed by provider at this time, moderate amount of clear fluid noted upon rupture, pads changed and patient repositioned in bed  0635) SVE by MARC Muniz - 6/100/-1  0700) Bedside report to GIANFRANCO Beck RN, POC discussed

## 2021-05-08 NOTE — ANESTHESIA PREPROCEDURE EVALUATION
25 yo F, G1 @40+5 requesting labor epidural. Not on AC. Plt 185.    Relevant Problems   No relevant active problems       Physical Exam    Airway   Mallampati: II  TM distance: >3 FB  Neck ROM: full       Cardiovascular - normal exam  Rhythm: regular  Rate: normal  (-) murmur     Dental - normal exam           Pulmonary - normal exam  Breath sounds clear to auscultation     Abdominal     Comments: gravid   Neurological - normal exam                 Anesthesia Plan    ASA 2       Plan - epidural   Neuraxial block will be labor analgesia                  Pertinent diagnostic labs and testing reviewed    Informed Consent:    Anesthetic plan and risks discussed with patient.         normal...

## 2021-05-08 NOTE — PROGRESS NOTES
S: In to introduce self to pt and FOB. Pt is feeling cramping. Discussed plan to place lam and cytotec. Pt agreeable.      O:    Vitals:    05/07/21 1535 05/07/21 1550 05/07/21 1606 05/07/21 1909   BP: 121/68 123/72 125/71 122/76   Pulse: 80 70 76 85   Resp:    18   Temp:    36.2 °C (97.2 °F)   TempSrc:    Temporal   Weight:       Height:               FHTs:  Baseline 135, Positive accels, negative decels, moderate variability        [place]: Contractions q 2-3 minutes, firm to palpation,         SVE: 1/70/-3     A/P:  1.  IUP @ 40w4d            2.  Cat 1 FHTs             3.  Lam placed, pt tolerated well             4.  Will reassess as indicated           5.  Will hold cytotec for now    Kandis Muniz CNM, APRN

## 2021-05-08 NOTE — CARE PLAN
Problem: Pain  Goal: Patient will have relaxed facial expressions and be able to rest between uterine contractions  Outcome: PROGRESSING AS EXPECTED  Note: Assessing and monitoring patient's pain and implementing pharmacologic and nonpharmacologic means of pain management as needed. Educating patient concerning pain management options such as the epidural and working with anesthesia to ensure adequate pain relief.       Problem: Risk for Infection, Impaired Wound Healing  Goal: Remain free from signs and symptoms of infection  Outcome: PROGRESSING AS EXPECTED  Note: Assessing and monitoring patient for s/s of infection and implementing precautions as needed. Educating patient and family concerning importance of hand hygiene especially when it comes to handling the baby

## 2021-05-08 NOTE — CARE PLAN
Problem: Risk for Fluid Imbalance  Goal: Promotion of Fluid Balance  Outcome: MET   IV fluids going and Duran in place.    Problem: Knowledge Deficit  Goal: Knowledge of disease process/condition, treatment plan, diagnostic tests, and medications will improve  Outcome: MET   POC discussed and pt states understanding, pt asks questions as they present.

## 2021-05-08 NOTE — ANESTHESIA PROCEDURE NOTES
Epidural Block    Date/Time: 5/8/2021 12:22 AM  Performed by: Mike Corona M.D.  Authorized by: Mike Corona M.D.     Patient Location:  OB  Start Time:  5/8/2021 12:22 AM  End Time:  5/8/2021 12:34 AM  Reason for Block: labor analgesia    patient identified, IV checked, site marked, risks and benefits discussed, surgical consent, monitors and equipment checked, pre-op evaluation and timeout performed    Patient Position:  Sitting  Prep: ChloraPrep, patient draped and sterile technique    Monitoring:  Blood pressure, continuous pulse oximetry and heart rate  Approach:  Midline  Location:  L3-L4  Injection Technique:  RENATO saline  Skin infiltration:  Lidocaine  Strength:  1%  Dose:  3ml  Needle Type:  Tuohy  Needle Gauge:  17 G  Needle Length:  3.5 in  Loss of resistance::  6.5  Catheter Size:  19 G  Catheter at Skin Depth:  13  Test Dose Result:  Negative   Success on first attempt.  Bolused w/ Bupiv 0.125% 4mL x2.

## 2021-05-08 NOTE — PROGRESS NOTES
S: Pt is resting comfortably with epidural.      O:    Vitals:    21 0544 21 0554 21 0605 21 0613   BP: 109/65 (!) 97/58 113/64 124/74   Pulse: 94 81 96 (!) 107   Resp:       Temp:       TempSrc:       SpO2:       Weight:       Height:               FHTs:  Baseline 125, + accels, - decels, moderate variability        [place]: Contractions q 2-4 minutes, firm to palpation, pitocin @ 8 milliunits        SVE: 6/100/-1     A/P:  1.  IUP @ 40w5d            2.  Cat 1 FHTs             3.  Will reassess as indicated             4.  Anticipate     Kandis Muniz, BEREM, APRN

## 2021-05-08 NOTE — PROGRESS NOTES
"0700  Report from NOC RN in room with pt and pt has no report of pain or nausea.  POC discussed and pt states understanding at this time.   0800  IV changed to D5LR for maintanence fluids.  0830  In with pt and pt awakened upon entry.  Pt into Throne position and has no report of pain or nausea.  0930  Pt reports having mild right sided pain where she has had in the past.  SVE with no cervical change made, IUPC placed and pads changed out.  Pt positioned to WR and bolus via epidural provided.  IV pitocin increased and pt has no other complaints at this time. 1000  IV pitocin decreased and pt again sleeping, no need for further pain coverage intervention.  1145  Pt resting until this time, awakened upon entry.  Pt reports feeling slight R sided pain in the same \"window\" she has had throughout the time following epidural.  Epidural bolus and pt to remain in that position for 15 more min.  1200  Position change to SF and pt has no report of pain or nausea.   1230  SVE with cervical change made, delivery prep and report to Dr. Webb.  Orders to wait to push until he tells me its ok.  1430  Position change for patient and POC discussed, pt states understanding and has no report of pain or nausea. 1515 Pt into Lithotomy and directed pushing started at this time.  Dr. Jones in room.  PD down to 110 x 2 min x 2 with the start of pushing.  Variables thereafter with pushing.  1645  Delivery of viable baby girl, apgars 8/9, second degree periurethral and first degree right labial repaired at this time.  Pt received local for the repair as well.  FF with light lochia.  1715  Pt has baby to chest and no complaints of pain or nausea at this time.  1815  Pt reports feeling pain now that her epidural has been turned off.  Attempted to get up to BR, unable to void, pericare performed and pt back to bed at this time.  Pt given food tray and PO medications shortly thereafter and epidural removed at this time. 1850  Report to " NOC RN in room with pt at this time.

## 2021-05-09 VITALS
TEMPERATURE: 97.4 F | HEIGHT: 59 IN | WEIGHT: 194 LBS | DIASTOLIC BLOOD PRESSURE: 78 MMHG | OXYGEN SATURATION: 96 % | HEART RATE: 86 BPM | BODY MASS INDEX: 39.11 KG/M2 | SYSTOLIC BLOOD PRESSURE: 117 MMHG | RESPIRATION RATE: 20 BRPM

## 2021-05-09 LAB
BASOPHILS # BLD AUTO: 0.3 % (ref 0–1.8)
BASOPHILS # BLD: 0.05 K/UL (ref 0–0.12)
EOSINOPHIL # BLD AUTO: 0.12 K/UL (ref 0–0.51)
EOSINOPHIL NFR BLD: 0.7 % (ref 0–6.9)
ERYTHROCYTE [DISTWIDTH] IN BLOOD BY AUTOMATED COUNT: 41.2 FL (ref 35.9–50)
ERYTHROCYTE [DISTWIDTH] IN BLOOD BY AUTOMATED COUNT: 43.8 FL (ref 35.9–50)
HCT VFR BLD AUTO: 31.1 % (ref 37–47)
HCT VFR BLD AUTO: 36 % (ref 37–47)
HGB BLD-MCNC: 10.6 G/DL (ref 12–16)
HGB BLD-MCNC: 12.6 G/DL (ref 12–16)
IMM GRANULOCYTES # BLD AUTO: 0.07 K/UL (ref 0–0.11)
IMM GRANULOCYTES NFR BLD AUTO: 0.4 % (ref 0–0.9)
LYMPHOCYTES # BLD AUTO: 2.45 K/UL (ref 1–4.8)
LYMPHOCYTES NFR BLD: 13.8 % (ref 22–41)
MCH RBC QN AUTO: 30 PG (ref 27–33)
MCH RBC QN AUTO: 30.3 PG (ref 27–33)
MCHC RBC AUTO-ENTMCNC: 34.1 G/DL (ref 33.6–35)
MCHC RBC AUTO-ENTMCNC: 35 G/DL (ref 33.6–35)
MCV RBC AUTO: 86.5 FL (ref 81.4–97.8)
MCV RBC AUTO: 88.1 FL (ref 81.4–97.8)
MONOCYTES # BLD AUTO: 0.62 K/UL (ref 0–0.85)
MONOCYTES NFR BLD AUTO: 3.5 % (ref 0–13.4)
NEUTROPHILS # BLD AUTO: 14.41 K/UL (ref 2–7.15)
NEUTROPHILS NFR BLD: 81.3 % (ref 44–72)
NRBC # BLD AUTO: 0 K/UL
NRBC BLD-RTO: 0 /100 WBC
PLATELET # BLD AUTO: 149 K/UL (ref 164–446)
PLATELET # BLD AUTO: 169 K/UL (ref 164–446)
PMV BLD AUTO: 10.4 FL (ref 9–12.9)
PMV BLD AUTO: 10.5 FL (ref 9–12.9)
RBC # BLD AUTO: 3.53 M/UL (ref 4.2–5.4)
RBC # BLD AUTO: 4.16 M/UL (ref 4.2–5.4)
WBC # BLD AUTO: 17.7 K/UL (ref 4.8–10.8)
WBC # BLD AUTO: 31.4 K/UL (ref 4.8–10.8)

## 2021-05-09 PROCEDURE — 85025 COMPLETE CBC W/AUTO DIFF WBC: CPT

## 2021-05-09 PROCEDURE — 85027 COMPLETE CBC AUTOMATED: CPT

## 2021-05-09 PROCEDURE — 700102 HCHG RX REV CODE 250 W/ 637 OVERRIDE(OP): Performed by: STUDENT IN AN ORGANIZED HEALTH CARE EDUCATION/TRAINING PROGRAM

## 2021-05-09 PROCEDURE — 36415 COLL VENOUS BLD VENIPUNCTURE: CPT

## 2021-05-09 PROCEDURE — A9270 NON-COVERED ITEM OR SERVICE: HCPCS | Performed by: STUDENT IN AN ORGANIZED HEALTH CARE EDUCATION/TRAINING PROGRAM

## 2021-05-09 RX ORDER — IBUPROFEN 800 MG/1
800 TABLET ORAL EVERY 8 HOURS PRN
Qty: 30 TABLET | Refills: 0 | Status: SHIPPED | OUTPATIENT
Start: 2021-05-09

## 2021-05-09 RX ADMIN — VITAMIN A, VITAMIN C, VITAMIN D, VITAMIN E, THIAMINE, RIBOFLAVIN, NIACIN, VITAMIN B6, FOLIC ACID, VITAMIN B12, CALCIUM, IRON, ZINC, COPPER 1 TABLET: 4000; 120; 400; 22; 1.84; 3; 20; 10; 1; 12; 200; 27; 25; 2 TABLET ORAL at 09:58

## 2021-05-09 ASSESSMENT — EDINBURGH POSTNATAL DEPRESSION SCALE (EPDS)
I HAVE BEEN ABLE TO LAUGH AND SEE THE FUNNY SIDE OF THINGS: NOT QUITE SO MUCH NOW
I HAVE BLAMED MYSELF UNNECESSARILY WHEN THINGS WENT WRONG: NOT VERY OFTEN
I HAVE BEEN SO UNHAPPY THAT I HAVE BEEN CRYING: ONLY OCCASIONALLY
THINGS HAVE BEEN GETTING ON TOP OF ME: NO, MOST OF THE TIME I HAVE COPED QUITE WELL
I HAVE FELT SAD OR MISERABLE: NOT VERY OFTEN
THE THOUGHT OF HARMING MYSELF HAS OCCURRED TO ME: NEVER
I HAVE FELT SCARED OR PANICKY FOR NO GOOD REASON: NO, NOT AT ALL
I HAVE BEEN ANXIOUS OR WORRIED FOR NO GOOD REASON: NO, NOT AT ALL
I HAVE BEEN SO UNHAPPY THAT I HAVE HAD DIFFICULTY SLEEPING: NOT AT ALL
I HAVE LOOKED FORWARD WITH ENJOYMENT TO THINGS: AS MUCH AS I EVER DID

## 2021-05-09 NOTE — PROGRESS NOTES
1940 - Transition RN Libia called to notify me of pts significant c/o pain despite pain medicines. I was occupied in another pts room and had CLAIRE Monahan check on pt. Pt with a significantly distended bladder, bleeding minimal. Pt assisted up to restroom and sat on the toilet for several minutes to help empty bladder and then made two additional subsequent trips. Pt with a large void. Per CLAIRE Monahan keep pt upstairs approx 1 more hour and have postpartum RNs scan her bladder post void when she transitions upstairs.   2026 - Pt assisted up to restroom again, void x1. Fundus firm 2 above the umbilicus, scant bleeding. CLAIRE Monahan aware.  2115 - Pt assisted up to restroom again, void x1. Fundus firm 2 above the umbilicus, scant bleeding. Pt transferred to post-partum and bedside report given to JAMEY Pimentel. Plan of care for timed bathroom visits and first time post-void bladder scan passed on to RN. Baby bands confirmed.

## 2021-05-09 NOTE — DISCHARGE PLANNING
Discharge Planning Assessment Post Partum    Reason for Referral: THC at start of pregnancy  Address: [place] [address]   Type of Living Situation: MOB, and her mother   Mom Diagnosis: pregnancy  Baby Diagnosis: normal delivery at term  Primary Language: English    Name of Baby: not given  Father of the Baby: Ayo Morrell, spouse   Involved in baby’s care? Yes and currently at bedside  Contact Information: [address] [place], [place] [address]fanny [place] [address], [phone]     Prenatal Care: yes  Mom's PCP: Alberto Field  PCP for new baby: Melia Huitron    Support System: FOB, mother in law- [place]   Coping/Bonding between mother & baby: yes  Source of Feeding: breast and bottle combo  Supplies for Infant: yes all including car seat    Mom's Insurance: united healthcare  Baby Covered on Insurance:yes  Mother Employed/School: employed  Other children in the home/names & ages: no    Financial Hardship/Income: n/a   Mom's Mental status: A/O  Services used prior to admit: prenatal care/vitamins    CPS History: no  Psychiatric History: no  Domestic Violence History: no  Drug/ETOH History: THC, once she knew she was pregnant she stopped     Resources Provided: counseling/substance rehab in [place], information on THC and breastfeeding from [facility]   Referrals Made: pt indicated no further needs     Clearance for Discharge: baby's UTOX negative, able to d/c home w/       Ongoing Plan: d/c home and f/u w/ MDs

## 2021-05-09 NOTE — ANESTHESIA TIME REPORT
Anesthesia Start and Stop Event Times     Date Time Event    5/8/2021 0021 Ready for Procedure     0022 Anesthesia Start     1645 Anesthesia Stop        Responsible Staff  05/08/21    Name Role Begin End    Min JAY Corona M.D. Anesth 0022 0700    Osmar Miranda M.D. Anesth 0700 1645        Preop Diagnosis (Free Text):  Pre-op Diagnosis             Preop Diagnosis (Codes):    Post op Diagnosis  Term pregnancy      Premium Reason  E. Weekend    Comments:

## 2021-05-09 NOTE — CARE PLAN
Problem: Pain Management  Goal: Pain level will decrease to patient's comfort goal  Outcome: PROGRESSING AS EXPECTED  Pain medication available PRN. Medication given prior to floor arrival. Discomfort decreased after first void. Reports tolerable pain at this time. Tucks/spray/ice/heat available at bedside for comfort measures.  Voiding adequately at this time.     Problem: Alteration in comfort related to episiotomy, vaginal repair and/or after birth pains  Goal: Patient is able to ambulate, care for self and infant  Outcome: PROGRESSING AS EXPECTED   Tucks/spray/ice/heat available at bedside for comfort measures.

## 2021-05-09 NOTE — PROGRESS NOTES
I Was in the operating room doing an emergency hysterectomy when patient was delivering and Dr. Dixon was called to attend delivery with the resident physician

## 2021-05-09 NOTE — L&D DELIVERY NOTE
SPONTANEOUS VAGINAL DELIVERY PROCEDURE NOTE    DATE OF DELIVERY: 2021     PRIMARY OBSTETRICIAN: Hayden Webb MD    DELIVERING OBSTETRICIAN: Junior Dixon MD    DELIVERING RESIDENT PHYSICIAN: Zakia Tubbs MD, PGY1, FMS    PROCEDURE: Spontaneous vaginal delivery    PREPROCEDURE DIAGNOSES:  1. IUP at 40w5d   2. Post-dates pregnancy   3. Induction of labor  4. Maternal obesity    POSTPROCEDURE DIAGNOSES:  1. IUP at 40w5d   2. Post-dates pregnancy   3. Induction of labor  4. Maternal obesity.  5. Postpartum status post normal spontaneous vaginal delivery.    ANESTHESIA: epidural    ANESTHESIOLOGIST: Mike Corona MD    FINDINGS:  1. Viable female infant in OA position delivered at 16:45 on 2021.   2. Birth Weight: unavailable at the time of this dictation   3. APGARs 8 at 1 minute and 9 at 5 minutes.  4. Intact, normal-appearing placenta, three-vessel cord, central insertion.  5. 2nd degree perineal laceration.  6. Meconium stained amniotic fluid.  7. Tight nuchal cord x1.     ESTIMATED BLOOD LOSS: 300mL    NARRATIVE: I was called emergently to attend the delivery of this patient while her attending physician was tied up in a separate procedure.  Until this point I was unaware of any details of the patient's history care or labor process.  The resident physician was actively pushing with the patient.  When I arrived the head was .  Over the next contraction, after I arrived, the fetal head delivered atraumatically. It was guided out gently without traction.  A nuchal cord was noted and unable to be reduced, thus, the infant was delivered through.  The shoulders and remainder of the infant delivered atraumatically with the next push.  The nuchal cord was reduced.  A large gush of meconium stained fluid was noted.  The infant was immediately placed on the patient's abdomen and warmed and dried, while the cord was doubly clamped and cut.  The infant was handed off to the awaiting baby nurse  and a respiratory therapist was called given the thick meconium. The infant was quickly vigorous and moving all extremities equally.  Active management of the third stage of labor with fundal massage and gentle cord traction allowed for brisk delivery of the placenta in [place] presentation.  Oxytocin was applied at postpartum dosing.  The placenta was inspected and appeared intact as noted above.  A survey of the patient's perineum, vulva and vagina revealed a second-degree perineal laceration and right periurethral/labial laceration.  The second-degree perineal laceration was repaired in the usual running fashion with 3-0 Vicryl, locked above the hymen, deep running stitches to close the perineal body and a subcuticular stitch up to the hymen.  Interrupted stitches of 3-0 Vicryl were used to repair the labial/periurethral laceration.  At the end of the repair the edges were hemostatic and well reapproximated.  As the resident was sewing the last stitch, the primary attending physician arrived.  The patient tolerated the procedure well.  Emergent cross coverage of delivery complicated the procedure.  Patient and her infant remained in her birthing room before later transfer to maternity    COMPLICATIONS: Emergent cross coverage of delivery    CONDITION: good    DISPOSITION: Labor room then Maternity, continued care through primary team    Junior Dixon MD, MS,  5/8/2021

## 2021-05-09 NOTE — L&D DELIVERY NOTE
SPONTANEOUS VAGINAL DELIVERY PRODEDURE NOTE    PATIENT ID:  NAME:  Nelida Morrell  MRN:               [id number]  YOB: 1995    On 2021  at 16:45, this 26 y.o., now  40w5d , GBS-neg female delivered via  under epidural anesthesia a viable female infant with yet to be determined weight with APGAR scores of 8 and 9 at one and five minutes. There was a single nuchal cord which was reduced and infant was bulb suctioned at delivery. Meconium stain fluid was present. Delayed cord clamping for 1 minute. Cord was doubly clamped, cut and infant handed to RN in attendance. An intact placenta was delivered spontaneously at 16:49 with 3 vessel cord. Upon vaginal exam, there was 2nd degree laceration at the perineum and a 1st degree at the right labia which was repaired using 3.0 vicryl in the usual sterile fashion. Estimated blood loss was 300cc. Patient will be transferred to postpartum in stable condition and infant to  nursery.    Delivery attended by Dr. Pierce who was present for the entire delivery.     Zakia Tubbs MD  Family Medicine Resident, PGY-1     isolated twi 3, qtc 463

## 2021-05-09 NOTE — ANESTHESIA POSTPROCEDURE EVALUATION
Patient: Nelida Morrell    Procedure Summary     Date: 05/08/21 Room / Location:     Anesthesia Start: 0022 Anesthesia Stop: 1645    Procedure: Labor Epidural Diagnosis:     Scheduled Providers:  Responsible Provider: Osmar Miranda M.D.    Anesthesia Type: epidural ASA Status: 2          Final Anesthesia Type: epidural  Last vitals  BP   Blood Pressure: 125/78    Temp   36.4 °C (97.5 °F)    Pulse   100   Resp   18    SpO2   97 %      Anesthesia Post Evaluation    Patient location during evaluation: PACU  Patient participation: complete - patient participated  Level of consciousness: awake and alert  Pain score: 0    Airway patency: patent  Anesthetic complications: no  Cardiovascular status: hemodynamically stable  Respiratory status: acceptable  Hydration status: euvolemic    PONV: none          No complications documented.

## 2021-05-09 NOTE — DISCHARGE INSTRUCTIONS
PATIENT DISCHARGE EDUCATION INSTRUCTION SHEET  REASONS TO CALL YOUR OBSTETRICIAN  · Persistent fever, shaking, chills (Temperature higher than 100.4) may indicate you have an infection  · Heavy bleeding: soaking more than 1 pad per hour; Passing clots an egg-sized clot or bigger may mean you have an postpartum hemorrhage  · Foul odor from vagina or bad smelling or discolored discharge or blood  · Breast infection (Mastitis symptoms); breast pain, chills, fever, redness or red streaks, may feel flu like symptoms  · Urinary pain, burning or frequency  · Incision that is not healing, increased redness, swelling, tenderness or pain, or any pus from episiotomy or  site may mean you have an infection  · Redness, swelling, warmth, or painful to touch in the calf area of your leg may mean you have a blood clot  · Severe or intensified depression, thoughts or feelings of wanting to hurt yourself or someone else   · Pain in chest, obstructed breathing or shortness of breath (trouble catching your breath) may mean you are having a postpartum complication. Call your provider immediately   · Headache that does not get better, even after taking medicine, a bad headache with vision changes or pain in the upper right area of your belly may mean you have high blood pressure or post birth preeclampsia. Call your provider immediately    HAND WASHING  All family and friends should wash their hands:  · Before and after holding the baby  · Before feeding the baby  · After using the restroom or changing the baby's diaper    WOUND CARE  Ask your physician for additional care instructions. In general:  ·  Incision:  · May shower and pat incision dry   · Keep the incision clean and dry  · There should not be any opening or pus from the incision  · Continue to walk at home 3 times a day   · Do NOT lift anything heavier than your baby (over 10 pounds)  · Encourage family to help participate in care of the  to allow  rest and mom time to heal  · Episiotomy/Laceration  · May use jacqueline-spray bottle, witch hazel pads and dermaplast spray for comfort  · Use jacqueline-spray bottle after urinating to cleanse perineal area  · To prevent burning during urination spray jacqueline-water bottle on labial area   · Pat perineal area dry until episiotomy/laceration is healed  · Continue to use jacqueline-bottle until bleeding stops as needed  · If have a 2nd degree laceration or greater, a Sitz bath can offer relief from soreness, burning, and inflammation   · Sitz Bath   · Sit in 6 inches of warm water and soak laceration as needed until the laceration heals    VAGINAL CARE AND BLEEDING  · Nothing inside vagina for 6 weeks:   · No sexual intercourse, tampons or douching  · Bleeding may continue for 2-4 weeks. Amount and color may vary  · Soaking 1 pad or more in an hour for several hours is considered heavy bleeding  · Passing large egg sized blood clots can be concerning  · If you feel like you have heavy bleeding or are having increasing amount of blood clots call your Obstetrician immediately  · If you begin feeling faint upon standing, feeling sick to your stomach, have clammy skin, a really fast heartbeat, have chills, start feeling confused, dizzy, sleepy or weak, or feeling like you're going to faint call your Obstetrician immediately    HYPERTENSION   Preeclampsia or gestational hypertension are types of high blood pressure that only pregnant women can get. It is important for you to be aware of symptoms to seek early intervention and treatment. If you have any of these symptoms immediately call your Obstetrician    · Vision changes or blurred vision   · Severe headache or pain that is unrelieved with medication and will not go away  · Persistent pain in upper abdomen or shoulder   · Increased swelling of face, feet, or hands  · Difficulty breathing or shortness of breath at rest  · Urinating less than usual    URINATION AND BOWEL MOVEMENTS  · Eating  "more fiber (bran cereal, fruits, and vegetables) and drinking plenty of fluids will help to avoid constipation  · Urinary frequency and urgency after childbirth is normal  · If you experience any urinary pain, burning or frequency call your provider    BIRTH CONTROL  · It is possible to become pregnant at any time after delivery and while breastfeeding  · Plan to discuss a method of birth control with your physician at your post delivery follow up visit    POSTPARTUM BLUES  During the first few days after birth, you may experience a sense of the \"blues\" which may include impatience, irritability or even crying. These feelings come and go quickly. However, as many as 1 in 10 women experience emotional symptoms known as postpartum depression.     POSTPARTUM DEPRESSION    May start as early as the second or third day after delivery or take several weeks or months to develop. Symptoms of \"blues\" are present, but are more intense: Crying spells; loss of appetite; feelings of hopelessness or loss of control; fear of touching the baby; over concern or no concern at all about the baby; little or no concern about your own appearance/caring for yourself; and/or inability to sleep or excessive sleeping. Contact your Obstetrician if you are experiencing any of these symptoms     PREVENTING SHAKEN BABY  If you are angry or stressed, PUT THE BABY IN THE CRIB, step away, take some deep breaths, and wait until you are calm to care for the baby. DO NOT SHAKE THE BABY. You are not alone, call a supporter for help.  · [facility] 24/7 crisis call line (063-333-6289) or (1-943.143.8530)  · You can also text them, text \"ANSWER\" (326879)      "

## 2021-05-09 NOTE — PROGRESS NOTES
2145- pt to floor. Fundus firm, 2FB above umbilicus. Lochia light. Discussed findings with LD RN. Per RN, this is baseline. Pit running per mar. Hat in toilet for next void and bladder scan to be performed post void. Pain managed. Heat/ice/tucks/spray available for comfort. POC discussed including feeding intervals, lactation and latch support, undressing baby for STS during feeds, temperature management including dressing and multiple layers, VS, labs, tests, and ambulation. No further needs at this time. Baby dressed and placed in open crib per request.     2330- voided 200ML. Traction called for post void residual bladder scan.     0015- [facility] paged for bladder scan.    0200- bladder scan 161ML    0300- tech from Lab called with critical result of WBC at 0255. Critical lab result read back to Tech.   CLAIRE Monahan notified of critical lab result at 0300.  Critical lab result read back by CLAIRE Monahan. CLAIRE states she will put in repeat CBC for later today.

## 2021-05-09 NOTE — LACTATION NOTE
This note was copied from a baby's chart.  Mother reports some soreness to nipples when infant is breastfeeding, has larger breasts and has been using football hold on both sides. Observed latch, assisted with tummy to tummy positioning and angle of asymmetrical latch with nose to nipple positioning. Able to achieve and sustained latch and taught mother to identify swallows, mother reports nipple comfort improved after assistance. Reviewed waking techniques, hunger cues, cluster feeding, frequency/duration of feeds, diaper output, and onset of lactogenesis stage 2. Plan is cue based breastfeeding at least 8 or more times per 24 hours. Provided outpatient lactation resources and encouraged to schedule 1:1 follow-up visit at [facility]. Denies questions/concerns.

## 2021-06-18 ENCOUNTER — POST PARTUM (OUTPATIENT)
Dept: OBGYN | Facility: CLINIC | Age: 26
End: 2021-06-18
Payer: COMMERCIAL

## 2021-06-18 VITALS — DIASTOLIC BLOOD PRESSURE: 71 MMHG | BODY MASS INDEX: 37.16 KG/M2 | WEIGHT: 184 LBS | SYSTOLIC BLOOD PRESSURE: 116 MMHG

## 2021-06-18 DIAGNOSIS — Z30.011 ENCOUNTER FOR INITIAL PRESCRIPTION OF CONTRACEPTIVE PILLS: ICD-10-CM

## 2021-06-18 DIAGNOSIS — Z30.09 ENCOUNTER FOR COUNSELING REGARDING CONTRACEPTION: ICD-10-CM

## 2021-06-18 PROBLEM — O99.213 OBESITY AFFECTING PREGNANCY IN THIRD TRIMESTER: Status: RESOLVED | Noted: 2021-02-25 | Resolved: 2021-06-18

## 2021-06-18 PROBLEM — R82.5 POSITIVE URINE DRUG SCREEN: Status: RESOLVED | Noted: 2020-11-23 | Resolved: 2021-06-18

## 2021-06-18 PROBLEM — Z34.03 SUPERVISION OF NORMAL FIRST PREGNANCY IN THIRD TRIMESTER: Status: RESOLVED | Noted: 2021-01-15 | Resolved: 2021-06-18

## 2021-06-18 PROCEDURE — 90050 PR POSTPARTUM VISIT: CPT | Performed by: OBSTETRICS & GYNECOLOGY

## 2021-06-18 RX ORDER — NORGESTIMATE AND ETHINYL ESTRADIOL 7DAYSX3 28
1 KIT ORAL DAILY
Qty: 28 TABLET | Refills: 11 | Status: SHIPPED | OUTPATIENT
Start: 2021-06-18

## 2021-06-18 ASSESSMENT — EDINBURGH POSTNATAL DEPRESSION SCALE (EPDS)
I HAVE BEEN ABLE TO LAUGH AND SEE THE FUNNY SIDE OF THINGS: AS MUCH AS I ALWAYS COULD
I HAVE LOOKED FORWARD WITH ENJOYMENT TO THINGS: AS MUCH AS I EVER DID
I HAVE FELT SCARED OR PANICKY FOR NO GOOD REASON: NO, NOT AT ALL
I HAVE BEEN ANXIOUS OR WORRIED FOR NO GOOD REASON: HARDLY EVER
THE THOUGHT OF HARMING MYSELF HAS OCCURRED TO ME: NEVER
I HAVE BEEN SO UNHAPPY THAT I HAVE BEEN CRYING: ONLY OCCASIONALLY
I HAVE FELT SAD OR MISERABLE: NOT VERY OFTEN
I HAVE BLAMED MYSELF UNNECESSARILY WHEN THINGS WENT WRONG: NOT VERY OFTEN
TOTAL SCORE: 5
I HAVE BEEN SO UNHAPPY THAT I HAVE HAD DIFFICULTY SLEEPING: NOT AT ALL
THINGS HAVE BEEN GETTING ON TOP OF ME: NO, MOST OF THE TIME I HAVE COPED QUITE WELL

## 2021-06-18 NOTE — PROGRESS NOTES
Subjective:      Nelida Morrell is a 26 y.o. female who presents postpartum exam            HPI patient is a 26-year-old G1, P1 who presents today for 6 weeks postpartum exam status post .  Patient is doing well without any complaints or concerns.  Patient states her menstruation started back yesterday.  She is breast-feeding and bottlefeeding but states she does not have much milk production so baby is doing mostly bottle.  She has used Tri-Sprintec previously for OCP and desires OCP for contraception.  She denies any depression symptoms.  Reports normal bowel and bladder functions.  Patient is planning for another baby in about a year time    ROS all organ systems are reviewed and are currently negative       Objective:     /71   Wt 83.5 kg (184 lb)   LMP 2020 (Approximate)   BMI 37.16 kg/m²      Physical Exam  Vitals and nursing note reviewed. Exam conducted with a chaperone present.   Constitutional:       General: She is not in acute distress.     Appearance: Normal appearance. She is obese. She is not toxic-appearing.   HENT:      Head: Normocephalic and atraumatic.   Eyes:      General: No scleral icterus.        Right eye: No discharge.         Left eye: No discharge.      Conjunctiva/sclera: Conjunctivae normal.   Cardiovascular:      Rate and Rhythm: Normal rate and regular rhythm.      Pulses: Normal pulses.      Heart sounds: Normal heart sounds. No murmur heard.   No gallop.    Pulmonary:      Effort: Pulmonary effort is normal. No respiratory distress.      Breath sounds: Normal breath sounds. No wheezing.   Chest:      Breasts:         Right: No bleeding, inverted nipple, mass, skin change or tenderness.         Left: No bleeding, inverted nipple, mass, skin change or tenderness.   Abdominal:      General: Abdomen is flat. Bowel sounds are normal. There is no distension.      Palpations: Abdomen is soft.      Tenderness: There is no abdominal tenderness.   Genitourinary:      General: Normal vulva.      Labia:         Right: No rash, tenderness, lesion or injury.         Left: No rash, tenderness, lesion or injury.       Vagina: Bleeding (Small amount of dark bleeding present) present. No vaginal discharge or tenderness.      Cervix: No cervical motion tenderness, friability or erythema.      Uterus: Not enlarged and not tender.       Adnexa:         Right: No mass, tenderness or fullness.          Left: No mass, tenderness or fullness.        Rectum: No external hemorrhoid.   Musculoskeletal:         General: No swelling or tenderness. Normal range of motion.      Cervical back: Normal range of motion and neck supple. No rigidity.      Right lower leg: No edema.      Left lower leg: No edema.   Lymphadenopathy:      Cervical: No cervical adenopathy.      Upper Body:      Right upper body: No supraclavicular or axillary adenopathy.      Left upper body: No supraclavicular or axillary adenopathy.      Lower Body: No right inguinal adenopathy. No left inguinal adenopathy.   Skin:     General: Skin is warm and dry.      Coloration: Skin is not jaundiced.      Findings: No bruising or lesion.   Neurological:      General: No focal deficit present.      Mental Status: She is alert and oriented to person, place, and time.      Gait: Gait normal.   Psychiatric:         Mood and Affect: Mood normal.         Behavior: Behavior normal.         Thought Content: Thought content normal.         Judgment: Judgment normal.              Discussion:  Discussed with patient today various forms of birth control available. We reviewed birth control pills, Depo-provera, NuvaRing, IUDs (both hormonal and non-hormonal), and Nexplanon.  Risks, benefits, and side effects to each method discussed in detail.  Also discussed with patient the barrier methods (condoms) available for contraception and prevention of STDs.  I discussed combination OCP is not recommended for patient that her breast-feeding and patient  stated she does not have much milk production anymore and baby is mostly bottlefeeding and would like to be restarted on Tri-Sprintec.  I discussed risk and benefits of this medication with breast-feeding.  After discussion of all the available methods, patient elects for Tri-Sprintec OCP.         Assessment/Plan:        1. Postpartum examination following vaginal delivery  26-year-old G1, P1 here for postpartum exam.  Exam is within normal limits  Diet and exercise discussed  Safe sex and backup contraception discussed  Breast exam discussed    2. Encounter for counseling regarding contraception  We discussed contraception options available    3. Encounter for initial prescription of contraceptive pills  After extensive counseling, patient states she would like to restart Tri-Sprintec.  Patient counseled on this medication including risk of breast-feeding and agrees to usage.  Prescription given  - Norgestim-Eth Estrad Triphasic (TRI-SPRINTEC) 0.18/0.215/0.25 MG-35 MCG Tab; Take 1 tablet by mouth every day.  Dispense: 28 tablet; Refill: 11    4.  Precautions and plan of care were reviewed.  Patient can resume full activities without restrictions

## 2021-06-18 NOTE — PROGRESS NOTES
Pt here today for postpartum exam.  Delivery Date: 5/8/21  Currently: Breast and bottle feeding n  BCM: Pt would like to start OCP  LMP: 6/17/21  Good ph:[phone]  EPDS score - 5  Pt denies any problems at this time

## 2022-03-25 ENCOUNTER — HOSPITAL ENCOUNTER (EMERGENCY)
Facility: MEDICAL CENTER | Age: 27
End: 2022-03-25
Attending: EMERGENCY MEDICINE
Payer: COMMERCIAL

## 2022-03-25 ENCOUNTER — APPOINTMENT (OUTPATIENT)
Dept: RADIOLOGY | Facility: MEDICAL CENTER | Age: 27
End: 2022-03-25
Attending: EMERGENCY MEDICINE
Payer: COMMERCIAL

## 2022-03-25 ENCOUNTER — OFFICE VISIT (OUTPATIENT)
Dept: URGENT CARE | Facility: PHYSICIAN GROUP | Age: 27
End: 2022-03-25
Payer: COMMERCIAL

## 2022-03-25 VITALS
OXYGEN SATURATION: 99 % | WEIGHT: 203 LBS | RESPIRATION RATE: 24 BRPM | BODY MASS INDEX: 40.92 KG/M2 | DIASTOLIC BLOOD PRESSURE: 74 MMHG | HEIGHT: 59 IN | SYSTOLIC BLOOD PRESSURE: 116 MMHG | HEART RATE: 93 BPM | TEMPERATURE: 96.5 F

## 2022-03-25 VITALS
HEIGHT: 59 IN | DIASTOLIC BLOOD PRESSURE: 76 MMHG | SYSTOLIC BLOOD PRESSURE: 129 MMHG | HEART RATE: 100 BPM | TEMPERATURE: 97.9 F | BODY MASS INDEX: 40.09 KG/M2 | RESPIRATION RATE: 16 BRPM | OXYGEN SATURATION: 94 % | WEIGHT: 198.85 LBS

## 2022-03-25 DIAGNOSIS — R10.9 LEFT SIDED ABDOMINAL PAIN: ICD-10-CM

## 2022-03-25 DIAGNOSIS — M54.50 ACUTE LEFT-SIDED LOW BACK PAIN WITHOUT SCIATICA: ICD-10-CM

## 2022-03-25 DIAGNOSIS — N20.1 URETEROLITHIASIS: ICD-10-CM

## 2022-03-25 LAB
ALBUMIN SERPL BCP-MCNC: 4.6 G/DL (ref 3.2–4.9)
ALBUMIN/GLOB SERPL: 1.4 G/DL
ALP SERPL-CCNC: 85 U/L (ref 30–99)
ALT SERPL-CCNC: 29 U/L (ref 2–50)
ANION GAP SERPL CALC-SCNC: 17 MMOL/L (ref 7–16)
APPEARANCE UR: ABNORMAL
APPEARANCE UR: CLEAR
APPEARANCE UR: NORMAL
AST SERPL-CCNC: 27 U/L (ref 12–45)
BACTERIA #/AREA URNS HPF: ABNORMAL /HPF
BACTERIA #/AREA URNS HPF: NEGATIVE /HPF
BASOPHILS # BLD AUTO: 0.3 % (ref 0–1.8)
BASOPHILS # BLD: 0.05 K/UL (ref 0–0.12)
BILIRUB SERPL-MCNC: 0.8 MG/DL (ref 0.1–1.5)
BILIRUB UR QL STRIP.AUTO: ABNORMAL
BILIRUB UR QL STRIP.AUTO: NEGATIVE
BILIRUB UR STRIP-MCNC: NORMAL MG/DL
BUN SERPL-MCNC: 12 MG/DL (ref 8–22)
CALCIUM SERPL-MCNC: 9.6 MG/DL (ref 8.5–10.5)
CHLORIDE SERPL-SCNC: 101 MMOL/L (ref 96–112)
CO2 SERPL-SCNC: 19 MMOL/L (ref 20–33)
COLOR UR AUTO: NORMAL
COLOR UR: ABNORMAL
COLOR UR: YELLOW
CREAT SERPL-MCNC: 0.74 MG/DL (ref 0.5–1.4)
EOSINOPHIL # BLD AUTO: 0.03 K/UL (ref 0–0.51)
EOSINOPHIL NFR BLD: 0.2 % (ref 0–6.9)
EPI CELLS #/AREA URNS HPF: ABNORMAL /HPF
EPI CELLS #/AREA URNS HPF: ABNORMAL /HPF
ERYTHROCYTE [DISTWIDTH] IN BLOOD BY AUTOMATED COUNT: 38.9 FL (ref 35.9–50)
GFR SERPLBLD CREATININE-BSD FMLA CKD-EPI: 114 ML/MIN/1.73 M 2
GLOBULIN SER CALC-MCNC: 3.2 G/DL (ref 1.9–3.5)
GLUCOSE SERPL-MCNC: 102 MG/DL (ref 65–99)
GLUCOSE UR STRIP.AUTO-MCNC: NEGATIVE MG/DL
HCT VFR BLD AUTO: 44 % (ref 37–47)
HGB BLD-MCNC: 15.4 G/DL (ref 12–16)
HYALINE CASTS #/AREA URNS LPF: ABNORMAL /LPF
HYALINE CASTS #/AREA URNS LPF: ABNORMAL /LPF
IMM GRANULOCYTES # BLD AUTO: 0.07 K/UL (ref 0–0.11)
IMM GRANULOCYTES NFR BLD AUTO: 0.4 % (ref 0–0.9)
INT CON NEG: NORMAL
INT CON POS: NORMAL
KETONES UR STRIP.AUTO-MCNC: 80 MG/DL
KETONES UR STRIP.AUTO-MCNC: 80 MG/DL
KETONES UR STRIP.AUTO-MCNC: NEGATIVE MG/DL
LEUKOCYTE ESTERASE UR QL STRIP.AUTO: ABNORMAL
LEUKOCYTE ESTERASE UR QL STRIP.AUTO: ABNORMAL
LEUKOCYTE ESTERASE UR QL STRIP.AUTO: NEGATIVE
LIPASE SERPL-CCNC: 22 U/L (ref 11–82)
LYMPHOCYTES # BLD AUTO: 1.32 K/UL (ref 1–4.8)
LYMPHOCYTES NFR BLD: 7.5 % (ref 22–41)
MCH RBC QN AUTO: 30.1 PG (ref 27–33)
MCHC RBC AUTO-ENTMCNC: 35 G/DL (ref 33.6–35)
MCV RBC AUTO: 85.9 FL (ref 81.4–97.8)
MICRO URNS: ABNORMAL
MICRO URNS: ABNORMAL
MONOCYTES # BLD AUTO: 0.39 K/UL (ref 0–0.85)
MONOCYTES NFR BLD AUTO: 2.2 % (ref 0–13.4)
NEUTROPHILS # BLD AUTO: 15.78 K/UL (ref 2–7.15)
NEUTROPHILS NFR BLD: 89.4 % (ref 44–72)
NITRITE UR QL STRIP.AUTO: NEGATIVE
NRBC # BLD AUTO: 0 K/UL
NRBC BLD-RTO: 0 /100 WBC
PH UR STRIP.AUTO: 5 [PH] (ref 5–8)
PH UR STRIP.AUTO: 5.5 [PH] (ref 5–8)
PH UR STRIP.AUTO: 5.5 [PH] (ref 5–8)
PLATELET # BLD AUTO: 302 K/UL (ref 164–446)
PMV BLD AUTO: 9.6 FL (ref 9–12.9)
POC URINE PREGNANCY TEST: NEGATIVE
POTASSIUM SERPL-SCNC: 4 MMOL/L (ref 3.6–5.5)
PROT SERPL-MCNC: 7.8 G/DL (ref 6–8.2)
PROT UR QL STRIP: 30 MG/DL
RBC # BLD AUTO: 5.12 M/UL (ref 4.2–5.4)
RBC # URNS HPF: ABNORMAL /HPF
RBC # URNS HPF: ABNORMAL /HPF
RBC UR QL AUTO: ABNORMAL
RBC UR QL AUTO: ABNORMAL
RBC UR QL AUTO: NORMAL
SODIUM SERPL-SCNC: 137 MMOL/L (ref 135–145)
SP GR UR STRIP.AUTO: 1.03
SP GR UR STRIP.AUTO: 1.03
SP GR UR STRIP.AUTO: 1.04
UROBILINOGEN UR STRIP-MCNC: 0.2 MG/DL
UROBILINOGEN UR STRIP.AUTO-MCNC: 1 MG/DL
UROBILINOGEN UR STRIP.AUTO-MCNC: 1 MG/DL
WBC # BLD AUTO: 17.6 K/UL (ref 4.8–10.8)
WBC #/AREA URNS HPF: ABNORMAL /HPF
WBC #/AREA URNS HPF: ABNORMAL /HPF
WBC CASTS #/AREA URNS LPF: ABNORMAL /LPF

## 2022-03-25 PROCEDURE — 700111 HCHG RX REV CODE 636 W/ 250 OVERRIDE (IP)

## 2022-03-25 PROCEDURE — 80053 COMPREHEN METABOLIC PANEL: CPT

## 2022-03-25 PROCEDURE — 96375 TX/PRO/DX INJ NEW DRUG ADDON: CPT

## 2022-03-25 PROCEDURE — 700111 HCHG RX REV CODE 636 W/ 250 OVERRIDE (IP): Performed by: EMERGENCY MEDICINE

## 2022-03-25 PROCEDURE — 96374 THER/PROPH/DIAG INJ IV PUSH: CPT

## 2022-03-25 PROCEDURE — 83690 ASSAY OF LIPASE: CPT

## 2022-03-25 PROCEDURE — 85025 COMPLETE CBC W/AUTO DIFF WBC: CPT

## 2022-03-25 PROCEDURE — 99214 OFFICE O/P EST MOD 30 MIN: CPT | Performed by: FAMILY MEDICINE

## 2022-03-25 PROCEDURE — 99285 EMERGENCY DEPT VISIT HI MDM: CPT

## 2022-03-25 PROCEDURE — 36415 COLL VENOUS BLD VENIPUNCTURE: CPT

## 2022-03-25 PROCEDURE — 81001 URINALYSIS AUTO W/SCOPE: CPT

## 2022-03-25 PROCEDURE — 81002 URINALYSIS NONAUTO W/O SCOPE: CPT | Performed by: FAMILY MEDICINE

## 2022-03-25 PROCEDURE — 81025 URINE PREGNANCY TEST: CPT | Performed by: FAMILY MEDICINE

## 2022-03-25 PROCEDURE — 700105 HCHG RX REV CODE 258: Performed by: EMERGENCY MEDICINE

## 2022-03-25 PROCEDURE — 74176 CT ABD & PELVIS W/O CONTRAST: CPT

## 2022-03-25 RX ORDER — MORPHINE SULFATE 4 MG/ML
4 INJECTION INTRAVENOUS ONCE
Status: COMPLETED | OUTPATIENT
Start: 2022-03-25 | End: 2022-03-25

## 2022-03-25 RX ORDER — ONDANSETRON 2 MG/ML
4 INJECTION INTRAMUSCULAR; INTRAVENOUS ONCE
Status: COMPLETED | OUTPATIENT
Start: 2022-03-25 | End: 2022-03-25

## 2022-03-25 RX ORDER — ONDANSETRON 4 MG/1
4 TABLET, ORALLY DISINTEGRATING ORAL ONCE
Status: COMPLETED | OUTPATIENT
Start: 2022-03-25 | End: 2022-03-25

## 2022-03-25 RX ORDER — SODIUM CHLORIDE 9 MG/ML
1000 INJECTION, SOLUTION INTRAVENOUS ONCE
Status: COMPLETED | OUTPATIENT
Start: 2022-03-25 | End: 2022-03-25

## 2022-03-25 RX ORDER — KETOROLAC TROMETHAMINE 30 MG/ML
60 INJECTION, SOLUTION INTRAMUSCULAR; INTRAVENOUS ONCE
Status: COMPLETED | OUTPATIENT
Start: 2022-03-25 | End: 2022-03-25

## 2022-03-25 RX ORDER — ONDANSETRON 4 MG/1
4 TABLET, ORALLY DISINTEGRATING ORAL EVERY 8 HOURS PRN
Qty: 10 TABLET | Refills: 0 | Status: SHIPPED | OUTPATIENT
Start: 2022-03-25

## 2022-03-25 RX ORDER — OXYCODONE HYDROCHLORIDE AND ACETAMINOPHEN 5; 325 MG/1; MG/1
1 TABLET ORAL EVERY 4 HOURS PRN
Qty: 12 TABLET | Refills: 0 | Status: SHIPPED | OUTPATIENT
Start: 2022-03-25 | End: 2022-03-30

## 2022-03-25 RX ADMIN — ONDANSETRON 4 MG: 2 INJECTION INTRAMUSCULAR; INTRAVENOUS at 18:16

## 2022-03-25 RX ADMIN — SODIUM CHLORIDE 1000 ML: 9 INJECTION, SOLUTION INTRAVENOUS at 18:33

## 2022-03-25 RX ADMIN — ONDANSETRON 4 MG: 4 TABLET, ORALLY DISINTEGRATING ORAL at 16:00

## 2022-03-25 RX ADMIN — MORPHINE SULFATE 4 MG: 4 INJECTION INTRAVENOUS at 18:16

## 2022-03-25 RX ADMIN — KETOROLAC TROMETHAMINE 60 MG: 30 INJECTION, SOLUTION INTRAMUSCULAR; INTRAVENOUS at 17:22

## 2022-03-25 ASSESSMENT — PAIN DESCRIPTION - PAIN TYPE
TYPE: ACUTE PAIN
TYPE: ACUTE PAIN

## 2022-03-25 NOTE — ED TRIAGE NOTES
"Nelida Morrell  27 y.o.  female  Chief Complaint   Patient presents with   • Abdominal Pain     Started this morning, initially more mild like \"period cramps\", radiating across low abdomen to L flank. Pain comes in \"waves\", worsening since this AM. Actively vomiting in triage.   • Sent from Urgent Care     Seen today & had UA & negative upreg. UA showed trace blood - given toradol IM & sent to ED to r/o stone.       Zofran & labs ordered. Patient educated on triage process, to alert staff if any changes in condition.    "

## 2022-03-25 NOTE — PROGRESS NOTES
Chief Complaint:    Chief Complaint   Patient presents with   • Abdominal Pain     Cramping that radiates from and to back starting today   • Emesis   • Dysmenorrhea       History of Present Illness:     present. She presents with severe pain - she points to left lower abdomen, left flank, and left lower back as location of pain. Pain started today and is so bad, she cannot sit down. LMP 1 month ago. She thought she might be starting her period soon, but has not definitely started it. No dysuria. No history of kidney stones or severe menstrual cramps. She vomited in exam room but denies persisting nausea.      Past Medical History:    No past medical history on file.    Past Surgical History:    No past surgical history on file.    Social History:    Social History     Socioeconomic History   • Marital status:      Spouse name: Not on file   • Number of children: Not on file   • Years of education: Not on file   • Highest education level: Not on file   Occupational History   • Not on file   Tobacco Use   • Smoking status: Never Smoker   • Smokeless tobacco: Never Used   Vaping Use   • Vaping Use: Never used   Substance and Sexual Activity   • Alcohol use: No   • Drug use: No   • Sexual activity: Yes     Partners: Male     Birth control/protection: None   Other Topics Concern   • Not on file   Social History Narrative   • Not on file     Social Determinants of Health     Financial Resource Strain: Not on file   Food Insecurity: Not on file   Transportation Needs: Not on file   Physical Activity: Not on file   Stress: Not on file   Social Connections: Not on file   Intimate Partner Violence: Not on file   Housing Stability: Not on file     Family History:    No family history on file.    Medications:    Current Outpatient Medications on File Prior to Visit   Medication Sig Dispense Refill   • Norgestim-Eth Estrad Triphasic (TRI-SPRINTEC) 0.18/0.215/0.25 MG-35 MCG Tab Take 1 tablet by mouth every day. 28  "tablet 11   • ibuprofen (MOTRIN) 800 MG Tab Take 1 tablet by mouth every 8 hours as needed. 30 tablet 0   • Prenatal Vit-Fe Fumarate-FA (PRENATAL 19 PO) Take  by mouth.       No current facility-administered medications on file prior to visit.     Allergies:    No Known Allergies      Vitals:    Vitals:    03/25/22 1403   BP: 116/74   Pulse: 93   Resp: (!) 24   Temp: 35.8 °C (96.5 °F)   TempSrc: Temporal   SpO2: 99%   Weight: 92.1 kg (203 lb)   Height: 1.499 m (4' 11\")       Physical Exam:    Constitutional: Vital signs reviewed. Appears well-developed and well-nourished. Constantly moaning loudly in pain. Standing and pacing due to pain.  ENT: External ears normal. Hearing normal.   Neck: Neck supple.   Pulmonary/Chest: Respirations non-labored.  Abdomen: Bowel sounds are normal active. Soft, non-distended, and no significant tenderness to palpation.  Musculoskeletal: No definite CVA TTP bilaterally.  Neurological: Alert and oriented to person, place, and time. Muscle tone normal. Coordination normal.   Skin: No rashes or lesions. Warm, dry, normal turgor.  Psychiatric: Judgment and thought content normal.       Diagnostics:    POCT Urinalysis  Order: [id number]   Status: Final result     Visible to patient: No (scheduled for 3/26/2022 12:34 PM)     Next appt: None     Dx: Left sided abdominal pain; Acute left...     0 Result Notes     Ref Range & Units  2:34 PM   POC Color Negative brown    POC Appearance Negative slightly cloudy    POC Leukocyte Esterase Negative negative    POC Nitrites Negative negative    POC Urobiligen Negative (0.2) mg/dL 0.2    POC Protein Negative mg/dL 30    POC Urine PH 5.0 - 8.0 5.5    POC Blood Negative large    POC Specific Gravity <1.005 - >1.030 1.030    POC Ketones Negative mg/dL negative    POC Bilirubin Negative mg/dL small    POC Glucose Negative mg/dL negative    Resulting Agency  [facility]              Specimen Collected: 03/25/22  2:34 PM Last Resulted: 03/25/22  2:34 PM    "        POCT PREGNANCY  Order: [id number]   Status: Final result     Visible to patient: Sarina (scheduled for 3/26/2022 12:33 PM)     Next appt: None     Dx: Left sided abdominal pain     0 Result Notes     Ref Range & Units  2:33 PM   POC Urine Pregnancy Test Negative negative    Internal Control Positive  Valid    Internal Control Negative  Valid    Resulting [email or web address]              Specimen Collected: 22  2:33 PM Last Resulted: 22  2:33 PM             Medical Decision Makin. Left sided abdominal pain  - POCT Urinalysis  - POCT PREGNANCY  - ketorolac (TORADOL) injection 60 mg    2. Acute left-sided low back pain without sciatica  - POCT Urinalysis  - ketorolac (TORADOL) injection 60 mg      Discussed with them DDX, management options, and risks, benefits, and alternatives to treatment plan agreed upon.     present. She presents with severe pain - she points to left lower abdomen, left flank, and left lower back as location of pain. Pain started today and is so bad, she cannot sit down. LMP 1 month ago. She thought she might be starting her period soon, but has not definitely started it. No dysuria. No history of kidney stones or severe menstrual cramps. She vomited in exam room but denies persisting nausea.    Constantly moaning loudly in pain. Standing and pacing due to pain.    Urine dipstick: Protein 30 mg/dl, Blood large, Bilirubin small.    Urine HCG is negative.    ? etiology of her severe left-sided lower abdomen, flank, and lower back pain. Possible ureter stone, ovarian cyst, or other.    No definite kidney infection on urine dipstick.    Discussed she may need CT and/or US imaging for further evaluation, we do not have these imaging modalities here.    Agreeable to Toradol IM given for pain.    Advised if pain is not improving within 1-2 hours from now, she needs to go to Emergency Room for further evaluation. They understand and agree.    Will return to urgent care if  needed.

## 2022-03-26 NOTE — ED NOTES
Discharge instructions and narcotic informed consent reviewed with patient and signed. IV was removed from arm. They were instructed on how to  prescriptions. They verbalized understanding of follow up instructions. All belongings with patient. They ambulate with a steady gait

## 2022-03-26 NOTE — ED PROVIDER NOTES
"ED Provider Note    CHIEF COMPLAINT  Chief Complaint   Patient presents with   • Abdominal Pain     Started this morning, initially more mild like \"period cramps\", radiating across low abdomen to L flank. Pain comes in \"waves\", worsening since this AM. Actively vomiting in triage.   • Sent from Urgent Care     Seen today & had UA & negative upreg. UA showed trace blood - given toradol IM & sent to ED to r/o stone.       HPI  Nelida Morrell is a 27 y.o. female who presents from urgent care for evaluation of abdominal pain and flank pain as well as vomiting.  The patient states that this morning she thought she had some cramps related to her upcoming menstrual period.  The pain involved essentially the lower abdomen but since involves the left side flank and across the entire abdomen to the right side.  Comes in waves and then improves.  Vomiting associated with this.  No diarrhea.  Went to urgent care where she had a negative pregnancy test, had a urinalysis showing blood without evidence of infection was sent here for further evaluation.  No medical problems.  At urgent care received and Toradol.    REVIEW OF SYSTEMS  Negative for fever, rash, chest pain, dyspnea, diarrhea, headache. All other systems are negative.     PAST MEDICAL HISTORY  No past medical history on file.    FAMILY HISTORY  No family history on file.    SOCIAL HISTORY  Social History     Tobacco Use   • Smoking status: Never Smoker   • Smokeless tobacco: Never Used   Vaping Use   • Vaping Use: Never used   Substance Use Topics   • Alcohol use: No   • Drug use: No       SURGICAL HISTORY  No past surgical history on file.    CURRENT MEDICATIONS  I personally reviewed the medication list in the charting documentation.     ALLERGIES  No Known Allergies    MEDICAL RECORD  I have reviewed patient's medical record and pertinent results are listed above.      PHYSICAL EXAM  VITAL SIGNS: /86   Pulse 80   Temp 36.3 °C (97.3 °F) (Temporal)   Resp " "16   Ht 1.499 m (4' 11\")   Wt 90.2 kg (198 lb 13.7 oz)   LMP 02/25/2022 (Exact Date)   SpO2 97%   BMI 40.16 kg/m²    Constitutional: Appears to be very uncomfortable, she is actively vomiting.  HENT: Normocephalic, no obvious evidence of acute trauma.  Eyes: No scleral icterus. Normal conjunctiva   Neck: Comfortable movement without any obvious restriction in the range of motion.  Cardiovascular: Upon ascultation I appreciate a regular heart rhythm and a normal rate.   Thorax & Lungs: Normal nonlabored respirations.  Upon application of the stethoscope for auscultation I find there to be no associated chest wall tenderness.  I appreciate no wheezing, rhonchi or rales. There is normal air movement.    Abdomen: The abdomen is not distended.  On palpation she has very mild generalized tenderness but no rebound, no guarding.  Seems worse on the left side.  Skin: The exposed portions of skin reveal no obvious rash or other abnormalities.  Neurologic: Alert & oriented. No focal deficits observed.   Psychiatric: Normal affect appropriate for the clinical situation.    DIAGNOSTIC STUDIES / PROCEDURES    LABS/EKGs  Results for orders placed or performed during the hospital encounter of 03/25/22   CBC WITH DIFFERENTIAL   Result Value Ref Range    WBC 17.6 (H) 4.8 - 10.8 K/uL    RBC 5.12 4.20 - 5.40 M/uL    Hemoglobin 15.4 12.0 - 16.0 g/dL    Hematocrit 44.0 37.0 - 47.0 %    MCV 85.9 81.4 - 97.8 fL    MCH 30.1 27.0 - 33.0 pg    MCHC 35.0 33.6 - 35.0 g/dL    RDW 38.9 35.9 - 50.0 fL    Platelet Count 302 164 - 446 K/uL    MPV 9.6 9.0 - 12.9 fL    Neutrophils-Polys 89.40 (H) 44.00 - 72.00 %    Lymphocytes 7.50 (L) 22.00 - 41.00 %    Monocytes 2.20 0.00 - 13.40 %    Eosinophils 0.20 0.00 - 6.90 %    Basophils 0.30 0.00 - 1.80 %    Immature Granulocytes 0.40 0.00 - 0.90 %    Nucleated RBC 0.00 /100 WBC    Neutrophils (Absolute) 15.78 (H) 2.00 - 7.15 K/uL    Lymphs (Absolute) 1.32 1.00 - 4.80 K/uL    Monos (Absolute) 0.39 0.00 " - 0.85 K/uL    Eos (Absolute) 0.03 0.00 - 0.51 K/uL    Baso (Absolute) 0.05 0.00 - 0.12 K/uL    Immature Granulocytes (abs) 0.07 0.00 - 0.11 K/uL    NRBC (Absolute) 0.00 K/uL   COMP METABOLIC PANEL   Result Value Ref Range    Sodium 137 135 - 145 mmol/L    Potassium 4.0 3.6 - 5.5 mmol/L    Chloride 101 96 - 112 mmol/L    Co2 19 (L) 20 - 33 mmol/L    Anion Gap 17.0 (H) 7.0 - 16.0    Glucose 102 (H) 65 - 99 mg/dL    Bun 12 8 - 22 mg/dL    Creatinine 0.74 0.50 - 1.40 mg/dL    Calcium 9.6 8.5 - 10.5 mg/dL    AST(SGOT) 27 12 - 45 U/L    ALT(SGPT) 29 2 - 50 U/L    Alkaline Phosphatase 85 30 - 99 U/L    Total Bilirubin 0.8 0.1 - 1.5 mg/dL    Albumin 4.6 3.2 - 4.9 g/dL    Total Protein 7.8 6.0 - 8.2 g/dL    Globulin 3.2 1.9 - 3.5 g/dL    A-G Ratio 1.4 g/dL   LIPASE   Result Value Ref Range    Lipase 22 11 - 82 U/L   URINALYSIS    Specimen: Urine   Result Value Ref Range    Color DK Yellow     Character Turbid (A)     Specific Gravity 1.039 <1.035    Ph 5.0 5.0 - 8.0    Glucose Negative Negative mg/dL    Ketones 80 (A) Negative mg/dL    Protein 30 (A) Negative mg/dL    Bilirubin Small (A) Negative    Urobilinogen, Urine 1.0 Negative    Nitrite Negative Negative    Leukocyte Esterase Trace (A) Negative    Occult Blood Large (A) Negative    Micro Urine Req Microscopic    ESTIMATED GFR   Result Value Ref Range    GFR (CKD-EPI) 114 >60 mL/min/1.73 m 2   URINE MICROSCOPIC (W/UA)   Result Value Ref Range    WBC 20-50 (A) /hpf    RBC 20-50 (A) /hpf    Bacteria Many (A) None /hpf    Epithelial Cells Few /hpf    Hyaline Cast 3-5 (A) /lpf    White Cell Cast 0-2 (A) /lpf   URINALYSIS,CULTURE IF INDICATED    Specimen: Urine, Straight Cath   Result Value Ref Range    Color Yellow     Character Clear     Specific Gravity 1.030 <1.035    Ph 5.5 5.0 - 8.0    Glucose Negative Negative mg/dL    Ketones 80 (A) Negative mg/dL    Protein 30 (A) Negative mg/dL    Bilirubin Negative Negative    Urobilinogen, Urine 1.0 Negative    Nitrite Negative  Negative    Leukocyte Esterase Trace (A) Negative    Occult Blood Large (A) Negative    Micro Urine Req Microscopic    URINE MICROSCOPIC (W/UA)   Result Value Ref Range    WBC 5-10 (A) /hpf    RBC 20-50 (A) /hpf    Bacteria Negative None /hpf    Epithelial Cells Few /hpf    Hyaline Cast 6-10 (A) /lpf        RADIOLOGY  CT-RENAL COLIC EVALUATION(A/P W/O)   Final Result      1.  There is a 4.5 mm stone in the distal left ureter with mild left hydronephrosis and proximal hydroureter and a minimal amount of left perinephric stranding.            COURSE & MEDICAL DECISION MAKING  I have reviewed any medical record information, laboratory studies and radiographic results as noted above.  Differential diagnoses includes: Ureterolithiasis, biliary obstruction, hepatitis, pancreatitis, dehydration, electrolyte abnormalities, anemia, bowel obstruction    Encounter Summary: This is a very pleasant 27 y.o. female who unfortunately required evaluation in the emergency department today with abdominal pain and vomiting, involves her flank, all began this morning.  Sent by urgent care after urinalysis revealed hematuria without evidence of infection.  On exam her abdomen is actually quite benign, minimal tenderness raising my suspicion for ureterolithiasis.  She will receive morphine and Zofran and IV fluids, CT scan will be obtained and she will be reevaluated ------- CT scan confirms the presence of ureterolithiasis with some mild hydronephrosis.  Her initial urinalysis look concerning for infection, this was repeated with a straight mini catheterization and this produced a urine sample without definitive evidence of infection, no bacteria seen.  At this point, the patient is improved.  She is feeling much better.  She will be discharged with prescription for Percocet and Zofran.  Strict return instructions discussed.  She will follow-up with urology.      In prescribing controlled substances to this patient, I certify that I have  obtained and reviewed the medical history of Nelida Morrell. I have also made a good silva effort to obtain applicable records from other providers who have treated the patient.    I have conducted a physical exam and documented it. I have reviewed Ms. Morrell’s prescription history as maintained by the [place] Prescription Monitoring Program.     I have assessed the patient’s risk for abuse, dependency, and addiction using the validated Opioid Risk Tool    Given the above, I believe the benefits of controlled substance therapy outweigh the risks. The reasons for prescribing controlled substances include my professional opinion that controlled substances are a reasonable choice for this patient in the event other methods are ineffective inadequately controlling pain. Accordingly, I have discussed the risk and benefits, treatment plan, and alternative therapies with the patient.     Dr. Prasad had to send the prescription for Percocet to the pharmacy as my electronic prescribing system was malfunctioning.      DISPOSITION: Discharged home in stable condition      FINAL IMPRESSION  1. Ureterolithiasis           This dictation was created using voice recognition software. The accuracy of the dictation is limited to the abilities of the software. I expect there may be some errors of grammar and possibly content. The nursing notes were reviewed and certain aspects of this information were incorporated into this note.    Electronically signed by: Arsh Garcia M.D., 3/25/2022 6:23 PM

## 2022-03-26 NOTE — ED NOTES
Pt ambulate to [facility]. Patient resting on stretcher.  Equal chest rise noted. VS stable on monitor. Bed locked and in low position. Call bell within reach

## 2022-11-08 NOTE — PROGRESS NOTES
Pt here today for OB follow up @ 28w3d  Pt states denies VB and LOF  Reports +FM  Good # [phone]  Pharmacy Confirmed.  Tdap today   CRISTIAN sheet given      Spironolactone Counseling: Patient advised regarding risks of diarrhea, abdominal pain, hyperkalemia, birth defects (for female patients), liver toxicity and renal toxicity. The patient may need blood work to monitor liver and kidney function and potassium levels while on therapy. The patient verbalized understanding of the proper use and possible adverse effects of spironolactone.  All of the patient's questions and concerns were addressed.